# Patient Record
Sex: FEMALE | Race: WHITE | NOT HISPANIC OR LATINO | Employment: FULL TIME | URBAN - METROPOLITAN AREA
[De-identification: names, ages, dates, MRNs, and addresses within clinical notes are randomized per-mention and may not be internally consistent; named-entity substitution may affect disease eponyms.]

---

## 2017-05-07 ENCOUNTER — TELEPHONE (OUTPATIENT)
Dept: NURSING | Facility: CLINIC | Age: 37
End: 2017-05-07

## 2017-05-07 NOTE — TELEPHONE ENCOUNTER
Call Type: Triage Call    Presenting Problem: patient states she was walking to her car and  twisted her Left foot in a dip in the grass and fell, she has noted  brusing, uanble to weight bear without 8/10 pain on the top left  side of her foot, ankle is fine. Pain is 5/10 at rest. 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  Triage Note:  Guideline Title: Foot Injury  Recommended Disposition: Provide Home/Self Care  Original Inclination: Wanted to speak with a nurse  Override Disposition:  Intended Action: Go to Urgent Care Center  Physician Contacted: No  Twisting injury with minimal symptoms now ?  YES  Foreign body that can't be removed ? NO  Burn injury ? NO  Orthopedic hardware (metal plate, lissy or screw) newly bulging under or through  skin ? NO  Unable to remove rings after using soap, lotion, oil, petroleum jelly or other  lubricants AND causing pain, discoloration or severe swelling ? NO  New severe pain ? NO  Wearing cast or splint AND new or worsening pain, swelling, numbness, tingling,  coolness or change in color that is NOT improved by elevation for 30 minutes OR  not resolved within 2 hours ? NO  New marked swelling (twice the normal size as compared to usual appearance) ? NO  Crushing or penetrating injury ? NO  Nail completely torn off or hanging OR hematoma involving more than half of nail  OR small hematoma with severe pain ? NO  New onset of inability to take unassisted weight-bearing steps ? NO  Obvious new deformity (bone is visibly out of place or misshapen) ? NO  Unable to remove rings after using soap, lotion, oil, petroleum jelly or other  lubricants AND is NOT causing pain, discoloration or severe swelling ? NO  Unbearable pain since injury ? NO  Any full thickness puncture wound (passes through the skin layers of epidermis and  dermis and penetrates into the underlying subcutaneous tissue) OR a  puncture  wound where the bottom of wound is not visible (even when wound edges are  ) ? NO  Cut, abrasion, or laceration is the primary concern ? NO  Puncture wound is the primary concern ? NO  Swelling continues or has not improved after home care for 72 hours or more. ? NO  Pain continues or has not improved after home care for 7 days or more. ? NO  New onset mild to moderate pain that has not improved with 48 hours of medical  treatment or home care ? NO  Extremity swelling or limitation of range of motion AND known bleeding disorder OR  taking blood thinner, chemotherapy or transplant medications ? NO  Any new OR worsening signs and symptoms of soft tissue infection ? NO  Any signs and symptoms of soft tissue infection that have not improved with 48  hours of medical care ? NO  Pain persisting for more than 8 hours after injury AND history of prior surgery to  the injured area. ? NO  Severe traumatic injury to extremity(ies) including complete or partial amputation  (other than single finger/toe); large amount or uncontrolled bleeding; severe  crush injury; or open fracture (bone through skin) ? NO  Injury immediately followed new (within last 8 hours) unexplained  weakness/paralysis, change in sensation, or inability to purposely move,  especially when only one side of body is involved. ? NO  New, persistent  change in sensation (numb, tingling, or no feeling), change in  skin color (pale or blue), feels cool to the touch, severe pain or no pulse  below level of injury. ? NO  Physician Instructions:  Care Advice: See provider if pain continues for 7 days with home care.  CAUTIONS  Avoid activity that causes or worsens symptoms.  Limit Swelling and Reduce Pain: - Rest the painful area and limit weight  bearing or gripping, and any strenuous exercise, especially repetitive  motion, playing tennis, or any activity that makes the pain worse. - Apply  a cloth-covered cold pack to the extremity for 15 - 20  minutes, 4 to 8  times a day. Cold helps relieve pain and swelling. Do not apply cold  therapy to a site for more than 30 minutes at a time. - Apply an elastic  bandage to the extremity to limit the swelling. Do not wrap extremity too  tightly. - Elevate the extremity above the level of the heart.  Analgesic/Antipyretic Advice - NSAIDs: Consider aspirin, ibuprofen,  naproxen or ketoprofen for pain or fever as directed on label or by  pharmacist/provider. PRECAUTIONS: - You should not take this medicine for  more than 10 days unless recommended by your provider. EXCEPTIONS: - Should  not be used if taking blood thinners or have bleeding problems. - Do not  use if have history of sensitivity/allergy to any of these medications  or history of cardiovascular, ulcer, kidney, liver disease or diabetes  unless approved by provider. - Do not exceed recommended dose or frequency.  Analgesic/Antipyretic Advice - Acetaminophen: Consider acetaminophen as  directed on label or by pharmacist/provider for pain or fever. PRECAUTIONS:  - Use if there is no history of liver disease, alcoholism, or intake of  three or more alcohol drinks per day - Only if approved by provider during  pregnancy or when breastfeeding - Do not exceed recommended dose or  frequency. Do not take more than 3000 milligrams (mg) in 24 hours. Do not  take this medicine for more than 10 days unless recommended by your  provider. - During pregnancy, acetaminophen should not be taken more than 3  consecutive days without telling provider - To make sure you don't take too  much, check other medicines you take to see if they also contain  acetaminophen.

## 2017-05-10 ENCOUNTER — TELEPHONE (OUTPATIENT)
Dept: FAMILY MEDICINE | Facility: OTHER | Age: 37
End: 2017-05-10

## 2017-05-10 ENCOUNTER — OFFICE VISIT (OUTPATIENT)
Dept: FAMILY MEDICINE | Facility: OTHER | Age: 37
End: 2017-05-10
Payer: COMMERCIAL

## 2017-05-10 VITALS
TEMPERATURE: 98.4 F | SYSTOLIC BLOOD PRESSURE: 120 MMHG | BODY MASS INDEX: 27.78 KG/M2 | DIASTOLIC BLOOD PRESSURE: 68 MMHG | OXYGEN SATURATION: 98 % | HEIGHT: 67 IN | WEIGHT: 177 LBS | HEART RATE: 114 BPM

## 2017-05-10 DIAGNOSIS — R82.90 NONSPECIFIC FINDING ON EXAMINATION OF URINE: ICD-10-CM

## 2017-05-10 DIAGNOSIS — N30.01 ACUTE CYSTITIS WITH HEMATURIA: Primary | ICD-10-CM

## 2017-05-10 DIAGNOSIS — S92.355D CLOSED NONDISPLACED FRACTURE OF FIFTH METATARSAL BONE OF LEFT FOOT WITH ROUTINE HEALING, SUBSEQUENT ENCOUNTER: ICD-10-CM

## 2017-05-10 LAB
ALBUMIN UR-MCNC: ABNORMAL MG/DL
APPEARANCE UR: CLEAR
BACTERIA #/AREA URNS HPF: ABNORMAL /HPF
BILIRUB UR QL STRIP: NEGATIVE
COLOR UR AUTO: YELLOW
GLUCOSE UR STRIP-MCNC: NEGATIVE MG/DL
HGB UR QL STRIP: ABNORMAL
KETONES UR STRIP-MCNC: NEGATIVE MG/DL
LEUKOCYTE ESTERASE UR QL STRIP: ABNORMAL
NITRATE UR QL: NEGATIVE
PH UR STRIP: 6 PH (ref 5–7)
RBC #/AREA URNS AUTO: ABNORMAL /HPF (ref 0–2)
SP GR UR STRIP: 1.02 (ref 1–1.03)
URN SPEC COLLECT METH UR: ABNORMAL
UROBILINOGEN UR STRIP-ACNC: 0.2 EU/DL (ref 0.2–1)
WBC #/AREA URNS AUTO: ABNORMAL /HPF (ref 0–2)

## 2017-05-10 PROCEDURE — 87186 SC STD MICRODIL/AGAR DIL: CPT | Performed by: PHYSICIAN ASSISTANT

## 2017-05-10 PROCEDURE — 99214 OFFICE O/P EST MOD 30 MIN: CPT | Performed by: PHYSICIAN ASSISTANT

## 2017-05-10 PROCEDURE — 87088 URINE BACTERIA CULTURE: CPT | Performed by: PHYSICIAN ASSISTANT

## 2017-05-10 PROCEDURE — 81001 URINALYSIS AUTO W/SCOPE: CPT | Performed by: PHYSICIAN ASSISTANT

## 2017-05-10 PROCEDURE — 87086 URINE CULTURE/COLONY COUNT: CPT | Performed by: PHYSICIAN ASSISTANT

## 2017-05-10 RX ORDER — TRAMADOL HYDROCHLORIDE 50 MG/1
50 TABLET ORAL EVERY 6 HOURS PRN
Qty: 40 TABLET | Refills: 0 | Status: SHIPPED | OUTPATIENT
Start: 2017-05-10 | End: 2017-05-10

## 2017-05-10 RX ORDER — TRAMADOL HYDROCHLORIDE 50 MG/1
50 TABLET ORAL EVERY 6 HOURS PRN
Qty: 40 TABLET | Refills: 0 | Status: SHIPPED | OUTPATIENT
Start: 2017-05-10 | End: 2018-06-13

## 2017-05-10 RX ORDER — NITROFURANTOIN 25; 75 MG/1; MG/1
100 CAPSULE ORAL 2 TIMES DAILY
Qty: 14 CAPSULE | Refills: 0 | Status: SHIPPED | OUTPATIENT
Start: 2017-05-10 | End: 2018-06-13

## 2017-05-10 RX ORDER — ESCITALOPRAM OXALATE 10 MG/1
10 TABLET ORAL DAILY
COMMUNITY
Start: 2017-04-25 | End: 2021-03-26

## 2017-05-10 ASSESSMENT — PAIN SCALES - GENERAL: PAINLEVEL: SEVERE PAIN (7)

## 2017-05-10 NOTE — TELEPHONE ENCOUNTER
Electronic communication with SANDRA. Informed he is able to see the patient today. Imelda Hoyos, RN, BSN   Scheduled appt today.  Next 5 appointments (look out 90 days)     May 10, 2017  3:30 PM CDT   SHORT with Don Prieto PA-C   [facility]    [address] [place] [id number]   [phone]                Imelda Hoyos, RN, BSN

## 2017-05-10 NOTE — TELEPHONE ENCOUNTER
Requested Provider:  anyone    PCP: Unknown, Provider    Reason for visit: UTI    Duration of symptoms: today    Have you been treated for this in the past? No    Additional comments: Patient prefers [facility] today, she declined future appt.

## 2017-05-10 NOTE — TELEPHONE ENCOUNTER
Routing to DP for today JM: Work in Request for possible UTI. Patient is leaving the state tomorrow and would like to see a provider verse being seen in UC,or [facility]. Please review and advise.     Jessica Tovar is a 36 year old female who calls with possible UTI.    NURSING ASSESSMENT:  Description:  Frequency, with pain while urinating, scant amount. Broke her foot on Saturday and feed that an Urgent Care doesn't do a thorough job. Denies fever, rashes, runny nose, pelvic pain, blood in urine, back/side pain. Has tried pushing fluids. Is leaving the state tomorrow and would like to be seen prior to this progressing.   Onset/duration:  today  Pain scale (0-10)   7/10 burning   Last exam/Treatment:  07/11/2007  Allergies:   Allergies   Allergen Reactions     Ofloxacin      floxin     NURSING PLAN: Nursing advice to patient to be seen by a provider within 2-4 hours    RECOMMENDED DISPOSITION:  See in 4 hours, another person to drive - for severe discomfort with urination  Will comply with recommendation: Yes  If further questions/concerns or if symptoms do not improve, worsen or new symptoms develop, call your PCP or [facility] as soon as possible.    NOTES:  Disposition was determined by the first positive assessment question, therefore all previous assessment questions were negative    Guideline used:  Telephone Triage Protocols for Nurses, Fourth Edition, Bria Chavis  Urination, painful  Nursing judgment  Routing to SANDRA Hoyos RN

## 2017-05-10 NOTE — MR AVS SNAPSHOT
After Visit Summary   5/10/2017    Jessica Tovar    MRN: [id number]           Patient Information     Date Of Birth          1980        Visit Information        Provider Department      5/10/2017 3:30 PM Don Prieto PA-C [facility]        Today's Diagnoses     Acute cystitis with hematuria    -  1    Nonspecific finding on examination of urine        Closed nondisplaced fracture of fifth metatarsal bone of left foot with routine healing, subsequent encounter          Care Instructions    You have a urinary tract infection so will treat with a 7 day course of Macrobid.  Drink plenty of fluids.  If symptoms do not improve, let me know.    Will prescribe tramadol to take during the day as needed to help with pain.  Start to wean off the boot in 3 weeks as directed.  If pain is not improving over the next month, you need to be seen again.  Ice the area to help with swelling.             Follow-ups after your visit        Who to contact     If you have questions or need follow up information about today's clinic visit or your schedule please contact [facility] directly at [phone].  Normal or non-critical lab and imaging results will be communicated to you by Content360hart, letter or phone within 4 business days after the clinic has received the results. If you do not hear from us within 7 days, please contact the clinic through Shout TVt or phone. If you have a critical or abnormal lab result, we will notify you by phone as soon as possible.  Submit refill requests through Blacklane or call your pharmacy and they will forward the refill request to us. Please allow 3 business days for your refill to be completed.          Additional Information About Your Visit        MyChart Information     Blacklane lets you send messages to your doctor, view your test results, renew your prescriptions, schedule appointments and more. To sign up, go to [email or web address] .  "Click on \"Log in\" on the left side of the screen, which will take you to the Welcome page. Then click on \"Sign up Now\" on the right side of the page.     You will be asked to enter the access code listed below, as well as some personal information. Please follow the directions to create your username and password.     Your access code is: [email or web address]  Expires: 2017  4:03 PM     Your access code will  in 90 days. If you need help or a new code, please call your [facility] clinic or [phone].        Care EveryWhere ID     This is your Care EveryWhere ID. This could be used by other organizations to access your [facility] medical records  [id number]        Your Vitals Were     Pulse Temperature Height Pulse Oximetry BMI (Body Mass Index)       114 98.4  F (36.9  C) (Temporal) 5' 7\" (1.702 m) 98% 27.72 kg/m2        Blood Pressure from Last 3 Encounters:   05/10/17 120/68   10/20/16 143/76   03/11/15 126/84    Weight from Last 3 Encounters:   05/10/17 177 lb (80.3 kg)   10/20/16 154 lb (69.9 kg)   07 140 lb (63.5 kg)              We Performed the Following     UA with Microscopic reflex to Culture     Urine Culture Aerobic Bacterial          Today's Medication Changes          These changes are accurate as of: 5/10/17  4:03 PM.  If you have any questions, ask your nurse or doctor.               Start taking these medicines.        Dose/Directions    nitrofurantoin (macrocrystal-monohydrate) 100 MG capsule   Commonly known as:  MACROBID   Used for:  Acute cystitis with hematuria   Started by:  Don Prieto PA-C        Dose:  100 mg   Take 1 capsule (100 mg) by mouth 2 times daily   Quantity:  14 capsule   Refills:  0       traMADol 50 MG tablet   Commonly known as:  ULTRAM   Used for:  Closed nondisplaced fracture of fifth metatarsal bone of left foot with routine healing, subsequent encounter   Started by:  Don Prieto PA-C        Dose:  50 mg   Take 1 tablet (50 mg) by mouth every 6 " hours as needed for pain   Quantity:  40 tablet   Refills:  0         Stop taking these medicines if you haven't already. Please contact your care team if you have questions.     ATIVAN 1 MG tablet   Generic drug:  LORazepam   Stopped by:  Don Prieto PA-C           FLUoxetine 10 MG capsule   Commonly known as:  PROzac   Stopped by:  Don Prieto PA-C           PAXIL CR 25 MG 24 hr tablet   Generic drug:  PARoxetine   Stopped by:  Don Prieto PA-C                Where to get your medicines      These medications were sent to [id number] - [place], [place] - [address]  [address] [address]    Hours:  [facility] [place] - numbers unchanged   9/8/03 kr Phone:  [phone]     nitrofurantoin (macrocrystal-monohydrate) 100 MG capsule         Some of these will need a paper prescription and others can be bought over the counter.  Ask your nurse if you have questions.     Bring a paper prescription for each of these medications     traMADol 50 MG tablet                Primary Care Provider    Provider Unknown       No address on file        Thank you!     Thank you for choosing [facility]  for your care. Our goal is always to provide you with excellent care. Hearing back from our patients is one way we can continue to improve our services. Please take a few minutes to complete the written survey that you may receive in the mail after your visit with us. Thank you!             Your Updated Medication List - Protect others around you: Learn how to safely use, store and throw away your medicines at www.disposemymeds.org.          This list is accurate as of: 5/10/17  4:03 PM.  Always use your most recent med list.                   Brand Name Dispense Instructions for use    ALEVE 220 MG tablet   Generic drug:  naproxen sodium      Reported on 5/10/2017       escitalopram 10 MG tablet    LEXAPRO     Take 10 mg by mouth daily       HYDROcodone-acetaminophen 5-325 MG  per tablet    NORCO    8 tablet    Take 1 tablet by mouth every 6 hours as needed for moderate to severe pain       INDERAL LA 60 MG 24 hr capsule   Generic drug:  propranolol     34    1 CAPSULE DAILY       nitrofurantoin (macrocrystal-monohydrate) 100 MG capsule    MACROBID    14 capsule    Take 1 capsule (100 mg) by mouth 2 times daily       traMADol 50 MG tablet    ULTRAM    40 tablet    Take 1 tablet (50 mg) by mouth every 6 hours as needed for pain

## 2017-05-10 NOTE — PROGRESS NOTES
"  SUBJECTIVE:                                                    Jessica Tovar is a 36 year old female who presents to clinic today for the following health issues:    HPI    URINARY TRACT SYMPTOMS     Onset: today    Description:   Painful urination (Dysuria): YES  Blood in urine (Hematuria): no  Delay in urine (Hesitency): YES     Intensity: 7/10    Progression of Symptoms:  NA    Accompanying Signs & Symptoms:  Fever/chills: no   Flank pain YES  Nausea and vomiting: no   Any vaginal symptoms: none  Abdominal/Pelvic Pain: no    History:   History of frequent UTI's: YES  History of kidney stones: no   Sexually Active: YES  Possibility of pregnancy: No    Precipitating factors:   Increased fluids         Therapies Tried and outcome: Increase fluid intake    Symptoms started today. She has a history of UTIs and pyelonephritis in the past.    She sustained a left foot fracture this past weekend and was placed in a CAM boot. She was given Percocet do take nightly as needed for pain but states she needs something during the day and ibuprofen causes an upset stomach and Tylenol is not beneficial. She works at a school.     Triage note from 05/10: \"Frequency, with pain while urinating, scant amount. Broke her foot on Saturday and feed that an Urgent Care doesn't do a thorough job. Denies fever, rashes, runny nose, pelvic pain, blood in urine, back/side pain. Has tried pushing fluids. Is leaving the state tomorrow and would like to be seen prior to this progressing.\"    Problem list and histories reviewed & adjusted, as indicated.  Additional history: none    ROS:  GENERAL: Denies fever, fatigue, weakness, weight gain, or weight loss.  CARDIOVASCULAR: Denies chest pain, shortness of breath, irregular heartbeats,  palpitations, or edema.  RESPIRATORY: Denies cough, hemoptysis, and shortness of breath.  GENITOURINARY: +Increased frequency, urgency, dysuria. Denies hematuria or incontinence.   MUSCULOSKELETAL: +Left foot " "pain and bruising.   NEUROLOGIC: Denies headache, fainting, dizziness, memory loss, numbness, tingling, or seizures.    OBJECTIVE:                                                    /68 (BP Location: Right arm, Patient Position: Chair, Cuff Size: Adult Regular)  Pulse 114  Temp 98.4  F (36.9  C) (Temporal)  Ht 5' 7\" (1.702 m)  Wt 177 lb (80.3 kg)  SpO2 98%  BMI 27.72 kg/m2  Body mass index is 27.72 kg/(m^2).  GENERAL: healthy, alert and no distress  RESP: lungs clear to auscultation - no rales, rhonchi or wheezes  CV: regular rate and rhythm, normal S1 S2, no S3 or S4, no murmur, click or rub  MS: There is ecchymosis, swelling and tenderness over the left lateral foot.   BACK: no CVA tenderness, no paralumbar tenderness    Diagnostic Test Results:    XR Left Foot  5/8/17 at [facility]  FINDINGS:     Transverse nondisplaced fractures of the base of the left fifth metatarsal. No other fracture. No dislocation.    Results for orders placed or performed in visit on 05/10/17   UA with Microscopic reflex to Culture   Result Value Ref Range    Color Urine Yellow     Appearance Urine Clear     Glucose Urine Negative NEG mg/dL    Bilirubin Urine Negative NEG    Ketones Urine Negative NEG mg/dL    Specific Gravity Urine 1.025 1.003 - 1.035    pH Urine 6.0 5.0 - 7.0 pH    Protein Albumin Urine Trace (A) NEG mg/dL    Urobilinogen Urine 0.2 0.2 - 1.0 EU/dL    Nitrite Urine Negative NEG    Blood Urine Moderate (A) NEG    Leukocyte Esterase Urine Moderate (A) NEG    Source Unspecified Urine     WBC Urine 25-50 (A) 0 - 2 /HPF    RBC Urine 10-25 (A) 0 - 2 /HPF    Bacteria Urine Few (A) NEG /HPF          ASSESSMENT/PLAN:                                                        ICD-10-CM    1. Acute cystitis with hematuria N30.01 UA with Microscopic reflex to Culture     nitrofurantoin, macrocrystal-monohydrate, (MACROBID) 100 MG capsule   2. Nonspecific finding on examination of urine R82.90 Urine Culture Aerobic " Bacterial   3. Closed nondisplaced fracture of fifth metatarsal bone of left foot with routine healing, subsequent encounter S92.355D traMADol (ULTRAM) 50 MG tablet     DISCONTINUED: traMADol (ULTRAM) 50 MG tablet         1-2. Symptoms and UA consistent with acute cystitis so will treat with a 10 day course of Macrobid. I encouraged her to drink plenty of water and if symptoms are not improving, she will contact the clinic.    3. I reviewed the x-ray results form [facility] and she has a non-displaced [place] fracture. She will continue with her CAM boot for 3 weeks and then start to wean off of it. She is unable to take the Percocet during the day during her job so will prescribe tramadol instead to take every 6 hours as needed. This will be a one time prescription. She will continue with ice and elevation and follow up if the pain is not improving over the next month.    Don Prieto PA-C  [facility]

## 2017-05-10 NOTE — PATIENT INSTRUCTIONS
You have a urinary tract infection so will treat with a 7 day course of Macrobid.  Drink plenty of fluids.  If symptoms do not improve, let me know.    Will prescribe tramadol to take during the day as needed to help with pain.  Start to wean off the boot in 3 weeks as directed.  If pain is not improving over the next month, you need to be seen again.  Ice the area to help with swelling.

## 2017-05-10 NOTE — NURSING NOTE
"Chief Complaint   Patient presents with     UTI       Initial /68 (BP Location: Right arm, Patient Position: Chair, Cuff Size: Adult Regular)  Pulse 114  Temp 98.4  F (36.9  C) (Temporal)  Ht 5' 7\" (1.702 m)  Wt 177 lb (80.3 kg)  SpO2 98%  BMI 27.72 kg/m2 Estimated body mass index is 27.72 kg/(m^2) as calculated from the following:    Height as of this encounter: 5' 7\" (1.702 m).    Weight as of this encounter: 177 lb (80.3 kg).  Medication Reconciliation: complete     Traci Silvestre, ROBERT      "

## 2017-05-13 LAB
BACTERIA SPEC CULT: ABNORMAL
MICRO REPORT STATUS: ABNORMAL
MICROORGANISM SPEC CULT: ABNORMAL
SPECIMEN SOURCE: ABNORMAL

## 2017-06-16 ENCOUNTER — TELEPHONE (OUTPATIENT)
Dept: FAMILY MEDICINE | Facility: OTHER | Age: 37
End: 2017-06-16

## 2017-06-16 NOTE — TELEPHONE ENCOUNTER
Summary:    Patient is due/failing the following:   PAP    Action needed:   Patient needs office visit for PAP.    Type of outreach:    none per care everywhere UTD    Questions for provider review:    None                                                                                                                                    Holley Bo  Please abstract the following data from this visit with this patient into the appropriate field in Epic:    Pap smear done on this date: 05/26/2016 (approximately), by this group: [facility], results in care everywhere.        Chart routed to abstraction .      Panel Management Review      Patient has the following on her problem list: None      Composite cancer screening  Chart review shows that this patient is due/due soon for the following Pap Smear

## 2018-06-13 ENCOUNTER — OFFICE VISIT (OUTPATIENT)
Dept: FAMILY MEDICINE | Facility: CLINIC | Age: 38
End: 2018-06-13
Payer: COMMERCIAL

## 2018-06-13 VITALS
WEIGHT: 191 LBS | TEMPERATURE: 97.6 F | OXYGEN SATURATION: 100 % | HEIGHT: 67 IN | SYSTOLIC BLOOD PRESSURE: 142 MMHG | RESPIRATION RATE: 20 BRPM | HEART RATE: 90 BPM | BODY MASS INDEX: 29.98 KG/M2 | DIASTOLIC BLOOD PRESSURE: 88 MMHG

## 2018-06-13 DIAGNOSIS — R03.0 ELEVATED BLOOD PRESSURE READING WITHOUT DIAGNOSIS OF HYPERTENSION: ICD-10-CM

## 2018-06-13 DIAGNOSIS — N92.0 EXCESSIVE OR FREQUENT MENSTRUATION: ICD-10-CM

## 2018-06-13 DIAGNOSIS — N94.6 DYSMENORRHEA: Primary | ICD-10-CM

## 2018-06-13 PROCEDURE — 99214 OFFICE O/P EST MOD 30 MIN: CPT | Performed by: FAMILY MEDICINE

## 2018-06-13 ASSESSMENT — PAIN SCALES - GENERAL: PAINLEVEL: NO PAIN (0)

## 2018-06-13 NOTE — MR AVS SNAPSHOT
After Visit Summary   6/13/2018    Jessica Tovar    MRN: [id number]           Patient Information     Date Of Birth          1980        Visit Information        Provider Department      6/13/2018 2:00 PM Damaso Eddy MD [facility]        Today's Diagnoses     Menorrhagia    -  1    Dysmenorrhea           Follow-ups after your visit        Additional Services     OB/GYN REFERRAL       Your provider has referred you to:  [facility]: [facility] [phone]   [email or web address]    Please be aware that coverage of these services is subject to the terms and limitations of your health insurance plan.  Call member services at your health plan with any benefit or coverage questions.      Please bring the following with you to your appointment:    (1) Any X-Rays, CTs or MRIs which have been performed.  Contact the facility where they were done to arrange for  prior to your scheduled appointment.   (2) List of current medications   (3) This referral request   (4) Any documents/labs given to you for this referral                  Your next 10 appointments already scheduled     Jun 19, 2018  1:40 PM CDT   Office Visit with Lazaro [facility]uitron [facility]   [facility]    [address] [place] [id number] [phone]           Bring a current list of meds and any records pertaining to this visit. For Physicals, please bring immunization records and any forms needing to be filled out. Please arrive 10 minutes early to complete paperwork.              Who to contact     If you have questions or need follow up information about today's clinic visit or your schedule please contact [facility] directly at [phone].  Normal or non-critical lab and imaging results will be communicated to you by MyChart, letter or phone within 4 business days after the clinic  "has received the results. If you do not hear from us within 7 days, please contact the clinic through [employer] or phone. If you have a critical or abnormal lab result, we will notify you by phone as soon as possible.  Submit refill requests through [employer] or call your pharmacy and they will forward the refill request to us. Please allow 3 business days for your refill to be completed.          Additional Information About Your Visit        [employer] Information     [employer] lets you send messages to your doctor, view your test results, renew your prescriptions, schedule appointments and more. To sign up, go to [email or web address] . Click on \"Log in\" on the left side of the screen, which will take you to the Welcome page. Then click on \"Sign up Now\" on the right side of the page.     You will be asked to enter the access code listed below, as well as some personal information. Please follow the directions to create your username and password.     Your access code is: [email or web address]  Expires: 2018  1:45 PM     Your access code will  in 90 days. If you need help or a new code, please call your [place] clinic or [phone].        Care EveryWhere ID     This is your Care EveryWhere ID. This could be used by other organizations to access your [place] medical records  [id number]        Your Vitals Were     Pulse Temperature Respirations Height Last Period Pulse Oximetry    90 97.6  F (36.4  C) (Temporal) 20 5' 7.1\" (1.704 m) 2018 100%    BMI (Body Mass Index)                   29.83 kg/m2            Blood Pressure from Last 3 Encounters:   18 142/88   05/10/17 120/68   10/20/16 143/76    Weight from Last 3 Encounters:   18 191 lb (86.6 kg)   05/10/17 177 lb (80.3 kg)   10/20/16 154 lb (69.9 kg)              We Performed the Following     OB/GYN REFERRAL        Primary Care Provider Fax #    Physician No Ref-Primary [phone]       No address on file        Equal Access to Services     " TARAS SCHNEIDER : Hadii aad ku marley Das, waaxda luqadaha, qaybta kaalmada shaunzackary, waxtess billie hayita khanhongjavier velarde . So [facility] [phone].    ATENCIÓN: Si habla español, tiene a fisher disposición servicios gratuitos de asistencia lingüística. Llame al [phone].    We comply with applicable federal civil rights laws and [place] laws. We do not discriminate on the basis of race, color, national origin, age, disability, sex, sexual orientation, or gender identity.            Thank you!     Thank you for choosing [facility]  for your care. Our goal is always to provide you with excellent care. Hearing back from our patients is one way we can continue to improve our services. Please take a few minutes to complete the written survey that you may receive in the mail after your visit with us. Thank you!             Your Updated Medication List - Protect others around you: Learn how to safely use, store and throw away your medicines at www.disposemymeds.org.          This list is accurate as of 6/13/18  2:54 PM.  Always use your most recent med list.                   Brand Name Dispense Instructions for use Diagnosis    ALEVE 220 MG tablet   Generic drug:  naproxen sodium      Reported on 5/10/2017        escitalopram 10 MG tablet    LEXAPRO     Take 10 mg by mouth daily

## 2018-06-13 NOTE — PROGRESS NOTES
SUBJECTIVE:   Jessica Tovar is a 37 year old female who presents to clinic today for the following health issues:      Chief Complaint   Patient presents with     Abnormal Bleeding Problem     Heavy periods with painful cramps ongoing         PROBLEMS TO ADD ON...  The patient notes that she has been having worsening periods over the past number of years.  She had her tubes tied with her second  back in  and has not been on birth control since.  She now bleeds for at least 7 days and for 3-4 of those she can barely leave the house for fear of having accidents.  She does wear a super tampons and maxipads but is still leak through.  She has a lot of cramping and uses Aleve ibuprofen and Tylenol to alleviate her cramping.  She has not been on any form of hormonal birth control for a number of years since she has had her tubes tied.    She knows that she did gain 50 pounds over the last couple years due to 2 foot fractures one on either foot at different times.  She would like to lose weight but has not been able to get back into her exercise routine.  She does work 2 jobs so is busy with work.  She does not smoke.  She has noticed that her blood pressure has been creeping up over the last couple years.    I reviewed her family history with her, she has depression and diabetes in her mother with diabetes in both her father and younger brother also.  Neither of her parents that she is aware of has had any issues with heart attack or stroke.    Problem list and histories reviewed & adjusted, as indicated.  Additional history: as documented    Patient Active Problem List   Diagnosis     Intractable migraine with aura     Previous  delivery, antepartum condition or complication     Past Surgical History:   Procedure Laterality Date     C  DELIVERY ONLY  10/27/99     C  DELIVERY ONLY  02    BTL at the time of      HC DILATION/CURETTAGE DIAG/THER NON OB  2001      "HC REMOVE TONSILS/ADENOIDS,<11 Y/O         Social History   Substance Use Topics     Smoking status: Former Smoker     Packs/day: 0.25     Years: 6.00     Types: Cigarettes     Quit date: 8/15/2001     Smokeless tobacco: Never Used      Comment: Stopped smoking when found out pg again.     Alcohol use Yes      Comment: Would drink 3 to 4  per month.     Family History   Problem Relation Age of Onset     DIABETES Mother      Depression Mother      Alcohol/Drug Father      ETOH     DIABETES Father      Depression Sister      older     Arthritis Maternal Grandmother      HEART DISEASE Paternal Grandfather      DIABETES Brother      younger     Other - See Comments Son      10/27/1999     Other - See Comments Son      05/08/2002         Current Outpatient Prescriptions   Medication Sig Dispense Refill     ALEVE 220 MG OR TABS Reported on 5/10/2017       escitalopram (LEXAPRO) 10 MG tablet Take 10 mg by mouth daily       Allergies   Allergen Reactions     Ofloxacin      floxin     No lab results found.   BP Readings from Last 3 Encounters:   06/13/18 142/88   05/10/17 120/68   10/20/16 143/76    Wt Readings from Last 3 Encounters:   06/13/18 191 lb (86.6 kg)   05/10/17 177 lb (80.3 kg)   10/20/16 154 lb (69.9 kg)            Reviewed and updated as needed this visit by clinical staff  Tobacco  Allergies  Meds  Soc Hx      Reviewed and updated as needed this visit by Provider         ROS:  Constitutional, HEENT, cardiovascular, pulmonary, gi and gu systems are negative, except as otherwise noted.    OBJECTIVE:     /88  Pulse 90  Temp 97.6  F (36.4  C) (Temporal)  Resp 20  Ht 5' 7.1\" (1.704 m)  Wt 191 lb (86.6 kg)  LMP 06/01/2018  SpO2 100%  BMI 29.83 kg/m2  Body mass index is 29.83 kg/(m^2).  GENERAL: healthy, alert and no distress  I did not do an exam today since he did not think it was indicated.  She is not currently got her period and has no cramping now.  Diagnostic Test Results:  none "     ASSESSMENT/PLAN:   (N92.0) Menorrhagia  (primary encounter diagnosis)  (N94.6) Dysmenorrhea  Comment: Patient is having excessively long and heavy periods with increased cramping over the past few years.  She would really like to have a hysterectomy but we discussed the various options that might be available to help control her symptoms.  We discussed hormonal control with OCPs versus Mirena IUD or Nexplanon insert or even the Depo-Provera shot.  Typically with progesterone only medications they decrease the buildup of the lining of the uterus which would help decrease the amount of bleeding that she gets each month and even can get her to the point where she has no bleeding at all.  The main issue with this hormone type of control is that it can have irregular bleeding for 3-6 months prior to that amenorrheic phase.    Next option would be to use something like a D&C with ablation which would have to be done with her GYN surgeon Dr. Huitron.  This essentially goes into clear out the uterine lining and then injure the endometrium by using heat to cauterize the cells which then prevent any buildup of the lining.  Again the majority will not have this procedure done do not get a.  Oftentimes if there are issues going on that are causing the cramping and wants help significant with the cramping issue.    The last option would be to do a hysterectomy and again she would need to see the GYN surgeon to discuss this procedure.  Plan: OB/GYN REFERRAL        The patient is agreeable to see Dr. Huitron to discuss her current issue and the options available again and to see what he would recommend.     (R03.0) Elevated blood pressure reading without diagnosis of hypertension  Comment: Patient had elevated systolic and diastolic pressures today and on recheck it was slightly improved but still borderline.  Plan: She will have it rechecked again when she sees Dr. Huitron in consultation and if it continues to be  "elevated may need to go on blood pressure medication.  She is aware that with weight loss she can lower blood pressure so she lost 35-40 pounds that would make a difference in her blood pressure and she may be able to avoid a blood pressure medication.     BP Screening:   Last 3 BP Readings:    BP Readings from Last 3 Encounters:   06/13/18 142/88   05/10/17 120/68   10/20/16 143/76       The following was recommended to the patient:  Re-screen within 4 weeks and recommend lifestyle modifications  Tobacco Cessation:   reports that she quit smoking about 16 years ago. Her smoking use included Cigarettes. She has a 1.50 pack-year smoking history. She has never used smokeless tobacco.      BMI:   Estimated body mass index is 29.83 kg/(m^2) as calculated from the following:    Height as of this encounter: 5' 7.1\" (1.704 m).    Weight as of this encounter: 191 lb (86.6 kg).   Weight management plan: The patient would like to lose weight but has not gotten back in an exercise program at this time.    cc: Lazaro Huitron M.D.     Electronically signed by:  Damaso Eddy M.D.  6/13/2018    "

## 2018-06-13 NOTE — NURSING NOTE
Chief Complaint   Patient presents with     Abnormal Bleeding Problem     Heavy periods with painful cramps ongoing     MP/MA

## 2018-06-19 ENCOUNTER — OFFICE VISIT (OUTPATIENT)
Dept: FAMILY MEDICINE | Facility: CLINIC | Age: 38
End: 2018-06-19
Payer: COMMERCIAL

## 2018-06-19 VITALS
OXYGEN SATURATION: 100 % | TEMPERATURE: 97.6 F | HEART RATE: 118 BPM | WEIGHT: 191 LBS | RESPIRATION RATE: 16 BRPM | BODY MASS INDEX: 29.83 KG/M2 | DIASTOLIC BLOOD PRESSURE: 92 MMHG | SYSTOLIC BLOOD PRESSURE: 140 MMHG

## 2018-06-19 DIAGNOSIS — R10.2 PELVIC PAIN IN FEMALE: ICD-10-CM

## 2018-06-19 DIAGNOSIS — N92.1 MENORRHAGIA WITH IRREGULAR CYCLE: Primary | ICD-10-CM

## 2018-06-19 LAB
ERYTHROCYTE [DISTWIDTH] IN BLOOD BY AUTOMATED COUNT: 13.2 % (ref 10–15)
HCT VFR BLD AUTO: 44.8 % (ref 35–47)
HGB BLD-MCNC: 14.3 G/DL (ref 11.7–15.7)
MCH RBC QN AUTO: 29.8 PG (ref 26.5–33)
MCHC RBC AUTO-ENTMCNC: 31.9 G/DL (ref 31.5–36.5)
MCV RBC AUTO: 93 FL (ref 78–100)
PLATELET # BLD AUTO: 214 10E9/L (ref 150–450)
RBC # BLD AUTO: 4.8 10E12/L (ref 3.8–5.2)
TSH SERPL DL<=0.005 MIU/L-ACNC: 0.77 MU/L (ref 0.4–4)
WBC # BLD AUTO: 9.7 10E9/L (ref 4–11)

## 2018-06-19 PROCEDURE — 85027 COMPLETE CBC AUTOMATED: CPT | Performed by: OBSTETRICS & GYNECOLOGY

## 2018-06-19 PROCEDURE — 84443 ASSAY THYROID STIM HORMONE: CPT | Performed by: OBSTETRICS & GYNECOLOGY

## 2018-06-19 PROCEDURE — 36415 COLL VENOUS BLD VENIPUNCTURE: CPT | Performed by: OBSTETRICS & GYNECOLOGY

## 2018-06-19 PROCEDURE — 88305 TISSUE EXAM BY PATHOLOGIST: CPT | Performed by: OBSTETRICS & GYNECOLOGY

## 2018-06-19 PROCEDURE — 58100 BIOPSY OF UTERUS LINING: CPT | Performed by: OBSTETRICS & GYNECOLOGY

## 2018-06-19 PROCEDURE — 99213 OFFICE O/P EST LOW 20 MIN: CPT | Mod: 25 | Performed by: OBSTETRICS & GYNECOLOGY

## 2018-06-19 ASSESSMENT — PAIN SCALES - GENERAL: PAINLEVEL: NO PAIN (0)

## 2018-06-19 NOTE — PROGRESS NOTES
SUBJECTIVE:                                                      Referral from Damaso Eddy       Reason for consultation:  Menorrhagia and severe dysmenorrhea    History:  Jessica  Is a 37 year old female  3 para ( 2 CS and 1 SAB )   who presents today because of menorrhagia for the past 3 years.  She had a tubal ligation with her last  so has not used birth control pills.  The menorrhagia is quite severe.  She soaks through pads for a few days each month.    She had a Pap through [facility] last year but I cannot currently find the results.  He states that it was normal.         Patient Active Problem List   Diagnosis     Intractable migraine with aura     Previous  delivery, antepartum condition or complication     Menorrhagia     Elevated blood pressure reading without diagnosis of hypertension     Past Surgical History:   Procedure Laterality Date     C  DELIVERY ONLY  10/27/99     C  DELIVERY ONLY  02    BTL at the time of      HC DILATION/CURETTAGE DIAG/THER NON OB  2001     HC REMOVE TONSILS/ADENOIDS,<13 Y/O         Social History   Substance Use Topics     Smoking status: Former Smoker     Packs/day: 0.25     Years: 6.00     Types: Cigarettes     Quit date: 8/15/2001     Smokeless tobacco: Never Used      Comment: Stopped smoking when found out pg again.     Alcohol use Yes      Comment: Would drink 3 to 4  per month.     Family History   Problem Relation Age of Onset     Diabetes Mother      Depression Mother      Alcohol/Drug Father      ETOH     Diabetes Father      Depression Sister      older     Arthritis Maternal Grandmother      HEART DISEASE Paternal Grandfather      Diabetes Brother      younger     Other - See Comments Son      10/27/1999     Other - See Comments Son      2002         Current Outpatient Prescriptions   Medication Sig Dispense Refill     ALEVE 220 MG OR TABS Reported on 5/10/2017       escitalopram  (LEXAPRO) 10 MG tablet Take 10 mg by mouth daily         ROS:  A 12 point systems review is negative except for what is listed above in the Subjective history.            OBJECTIVE:                                                    Vital signs: Blood pressure (!) 140/92, pulse 118, temperature 97.6  F (36.4  C), temperature source Temporal, resp. rate 16, weight 191 lb (86.6 kg), last menstrual period 2018, SpO2 100 %, not currently breastfeeding.    Repeat BP is 135/80    HEENT is normal.      Neck is supple, mobile, no adenoapthy or masses palpable. Normal range of motion noted.     Chest is clear to auscultation. No wheezes, rales or rhonchi heard.  cardiac exam is normal with s1, s2, no murmurs or adventitious sounds.Normal rate and rhythm is heard.     Abdomen is soft,  nondistended, No masses felt.No HSM. No guarding or rigidity or rebound noted. Palpation reveals  no    tenderness   Normal bowel sounds heard.   She has a healed scar from  section and also laparoscopic scars from cholecystectomy.    Pelvic exam:My nurse Gwyn Lujan was present to chaperone the exam.    The external genitalia appeared normal.      The vaginal vault was without bleeding  or   discharge or odor.      The cervix was smooth and shiny and normal in appearance.      No vaginal support defects were noted,      Bimanual exam revealed a 7 week   sized uterus. It does not   descend  at all  well in the vaginal vault.      No adnexal masses were felt.      There was no  cervical motion tenderness.      Exam was moderately  limited by the patient's body habitus as well as her guarding.      With my nurse present, and after receiving informed consent from the patient, I performed the endometrial biopsy in the usual fashion using a standard pipelle. The pipelle sounded to 6 cm. I sent the biopsy for pathology. She tolerated the procedure well. She was instructed to call or present to clinic or ER if she has unusual amount of  cramping, bleeding, fever or vaginal discharge.                 ASSESSMENT/PLAN:                                                        1.37 year old female  with Menorrhagia. Differential diagnosis would include:  A. thyroid dysfunction(hypothroid or hyperthroid)- will check TSH      B. uterine leiomyomata(fibroids)- -will check pelvic US     C. endometrial hyperplasia -  await the endometrial biopsy results    D. anovulatory cycles, such as from PCO, stress, weight gain or loss, aging, etc  E.  With the menorrhagia, one must rule out anemia.- will check cbc        2. Severe dysmenorrhea-this eliminates Mirena IUD and ablation as reasonable options that would treat both the menorrhagia and the pain.  Hysterectomy is probably her best option but I would like to see her weight first as this would lower her morbidity risk from surgery.     She told me that she is not interested in any option except hysterectomy. I advised her to try to lose 20 pounds first. She doesn't think this is possible. She is homebound for 1 week a month with these menstrual issues. I told her that at her current weight and with 2 CS and no vaginal deliveries her odds of an open procedure are higher than average. Also the risk of surgical morbidity goes up. She understands this and is willing to take her chances of higher morbidity- she really is miserable- I gave her booklets to read and we will discuss all of this more next week.      There are no incontinence issues-                                                                      Thank you for this consultation.    Copy to  Damaso Eddy MD  [facility]

## 2018-06-19 NOTE — MR AVS SNAPSHOT
After Visit Summary   6/19/2018    Jessica Tovar    MRN: [id number]           Patient Information     Date Of Birth          1980        Visit Information        Provider Department      6/19/2018 1:40 PM Lazaro Huitron MD [facility]        Today's Diagnoses     Menorrhagia with irregular cycle    -  1    Pelvic pain in female           Follow-ups after your visit        Your next 10 appointments already scheduled     Jun 20, 2018 10:30 AM CDT   US PELVIC COMPLETE W TRANSVAGINAL with ERUS1   [facility]    [address] [address]   [phone]           Please bring a list of your medicines (including vitamins, minerals and over-the-counter drugs). Also, tell your doctor about any allergies you may have. Wear comfortable clothes and leave your valuables at home.  Adults: Drink six 8-ounce glasses of fluid one hour before your exam. Do NOT empty your bladder.  If you need to empty your bladder before your exam, try to release only a little bit of urine. Then, drink another 8oz glass of fluid.  Children: Children who are potty trained should drink at least 4 cups (32 oz) of liquid 45 minutes to one hour prior to the exam. The child s bladder must be full in order to achieve a diagnostic exam. If your child is very uncomfortable or has an urgent need to pee, please notify a technologist; they will try to find out how much longer the wait may be and provide instructions to help relieve the pressure. Occasionally it is medically necessary to insert a urinary catheter to fill the bladder.  Please call the Imaging Department at your exam site with any questions.            Jun 28, 2018 10:50 AM CDT   [facility]    [address] [id number] [phone]              Future tests that were ordered for you  "today     Open Future Orders        Priority Expected Expires Ordered    US Pelvic Complete with Transvaginal Routine  2019            Who to contact     If you have questions or need follow up information about today's clinic visit or your schedule please contact [facility] directly at [phone].  Normal or non-critical lab and imaging results will be communicated to you by MyChart, letter or phone within 4 business days after the clinic has received the results. If you do not hear from us within 7 days, please contact the clinic through [employer] or phone. If you have a critical or abnormal lab result, we will notify you by phone as soon as possible.  Submit refill requests through [employer] or call your pharmacy and they will forward the refill request to us. Please allow 3 business days for your refill to be completed.          Additional Information About Your Visit        MyChart Information     [employer] lets you send messages to your doctor, view your test results, renew your prescriptions, schedule appointments and more. To sign up, go to [email or web address] . Click on \"Log in\" on the left side of the screen, which will take you to the Welcome page. Then click on \"Sign up Now\" on the right side of the page.     You will be asked to enter the access code listed below, as well as some personal information. Please follow the directions to create your username and password.     Your access code is: [email or web address]  Expires: 2018  1:45 PM     Your access code will  in 90 days. If you need help or a new code, please call your [facility] or [phone].        Care EveryWhere ID     This is your Care EveryWhere ID. This could be used by other organizations to access your [place] medical records  [id number]        Your Vitals Were     Pulse Temperature Respirations Last Period Pulse Oximetry Breastfeeding?    118 97.6  F (36.4  C) (Temporal) 16 2018 100% No    " BMI (Body Mass Index)                   29.83 kg/m2            Blood Pressure from Last 3 Encounters:   06/19/18 (!) 140/92   06/13/18 142/88   05/10/17 120/68    Weight from Last 3 Encounters:   06/19/18 191 lb (86.6 kg)   06/13/18 191 lb (86.6 kg)   05/10/17 177 lb (80.3 kg)              We Performed the Following     CBC with platelets     ENDOMETRIAL BIOPSY W/O CERVICAL DILATION     Surgical pathology exam     TSH with free T4 reflex        Primary Care Provider Office Phone # Fax #    Damaso Eddy [phone]       [address] [place] [address]        Equal Access to Services     TARAS SCHNEIDER : Linda Das, gustavo ivory, qarenate kaalmada yuri, hardik sierra. So [facility] [phone].    ATENCIÓN: Si habla español, tiene a fisher disposición servicios gratuitos de asistencia lingüística. Llame al [phone].    We comply with applicable federal civil rights laws and [place] laws. We do not discriminate on the basis of race, color, national origin, age, disability, sex, sexual orientation, or gender identity.            Thank you!     Thank you for choosing [facility]  for your care. Our goal is always to provide you with excellent care. Hearing back from our patients is one way we can continue to improve our services. Please take a few minutes to complete the written survey that you may receive in the mail after your visit with us. Thank you!             Your Updated Medication List - Protect others around you: Learn how to safely use, store and throw away your medicines at www.disposemymeds.org.          This list is accurate as of 6/19/18  2:06 PM.  Always use your most recent med list.                   Brand Name Dispense Instructions for use Diagnosis    ALEVE 220 MG tablet   Generic drug:  naproxen sodium      Reported on 5/10/2017        escitalopram 10 MG tablet    LEXAPRO     Take 10 mg by mouth daily

## 2018-06-20 ENCOUNTER — RADIANT APPOINTMENT (OUTPATIENT)
Dept: ULTRASOUND IMAGING | Facility: OTHER | Age: 38
End: 2018-06-20
Attending: OBSTETRICS & GYNECOLOGY
Payer: COMMERCIAL

## 2018-06-20 DIAGNOSIS — R10.2 PELVIC PAIN IN FEMALE: ICD-10-CM

## 2018-06-20 DIAGNOSIS — N92.1 MENORRHAGIA WITH IRREGULAR CYCLE: ICD-10-CM

## 2018-06-20 PROCEDURE — 76830 TRANSVAGINAL US NON-OB: CPT

## 2018-06-20 PROCEDURE — 76856 US EXAM PELVIC COMPLETE: CPT

## 2018-06-24 ENCOUNTER — MYC MEDICAL ADVICE (OUTPATIENT)
Dept: FAMILY MEDICINE | Facility: CLINIC | Age: 38
End: 2018-06-24

## 2018-06-24 LAB — COPATH REPORT: NORMAL

## 2018-06-25 NOTE — TELEPHONE ENCOUNTER
Patient had:  ENDOMETRIAL BIOPSY W/O CERVICAL DILATION [62236 (CPT )] (Order [id number])   Is it normal for patient to be spotting and cramping 6 days post procedure?    Routing to PCP for further advice.    Odalis Casillas RN

## 2018-06-28 ENCOUNTER — OFFICE VISIT (OUTPATIENT)
Dept: FAMILY MEDICINE | Facility: CLINIC | Age: 38
End: 2018-06-28
Payer: COMMERCIAL

## 2018-06-28 ENCOUNTER — TELEPHONE (OUTPATIENT)
Dept: OBGYN | Facility: CLINIC | Age: 38
End: 2018-06-28

## 2018-06-28 VITALS
DIASTOLIC BLOOD PRESSURE: 100 MMHG | TEMPERATURE: 98.2 F | BODY MASS INDEX: 29.67 KG/M2 | RESPIRATION RATE: 16 BRPM | WEIGHT: 190 LBS | SYSTOLIC BLOOD PRESSURE: 142 MMHG | OXYGEN SATURATION: 98 % | HEART RATE: 80 BPM

## 2018-06-28 DIAGNOSIS — I10 BENIGN ESSENTIAL HYPERTENSION: Primary | ICD-10-CM

## 2018-06-28 DIAGNOSIS — N92.0 MENORRHAGIA WITH REGULAR CYCLE: Primary | ICD-10-CM

## 2018-06-28 DIAGNOSIS — N92.1 MENORRHAGIA WITH IRREGULAR CYCLE: ICD-10-CM

## 2018-06-28 DIAGNOSIS — R10.2 PELVIC PAIN IN FEMALE: ICD-10-CM

## 2018-06-28 DIAGNOSIS — N83.201 CYST OF RIGHT OVARY: ICD-10-CM

## 2018-06-28 LAB
ANION GAP SERPL CALCULATED.3IONS-SCNC: 8 MMOL/L (ref 3–14)
BUN SERPL-MCNC: 10 MG/DL (ref 7–30)
CALCIUM SERPL-MCNC: 8.8 MG/DL (ref 8.5–10.1)
CHLORIDE SERPL-SCNC: 108 MMOL/L (ref 94–109)
CO2 SERPL-SCNC: 26 MMOL/L (ref 20–32)
CREAT SERPL-MCNC: 0.83 MG/DL (ref 0.52–1.04)
GFR SERPL CREATININE-BSD FRML MDRD: 77 ML/MIN/1.7M2
GLUCOSE SERPL-MCNC: 80 MG/DL (ref 70–99)
POTASSIUM SERPL-SCNC: 4 MMOL/L (ref 3.4–5.3)
SODIUM SERPL-SCNC: 142 MMOL/L (ref 133–144)

## 2018-06-28 PROCEDURE — 36415 COLL VENOUS BLD VENIPUNCTURE: CPT | Performed by: OBSTETRICS & GYNECOLOGY

## 2018-06-28 PROCEDURE — 99213 OFFICE O/P EST LOW 20 MIN: CPT | Performed by: OBSTETRICS & GYNECOLOGY

## 2018-06-28 PROCEDURE — 80048 BASIC METABOLIC PNL TOTAL CA: CPT | Performed by: OBSTETRICS & GYNECOLOGY

## 2018-06-28 RX ORDER — CEFAZOLIN SODIUM 1 G/50ML
1 INJECTION, SOLUTION INTRAVENOUS SEE ADMIN INSTRUCTIONS
Status: CANCELLED | OUTPATIENT
Start: 2018-06-28

## 2018-06-28 RX ORDER — OXYCODONE HCL 10 MG/1
10 TABLET, FILM COATED, EXTENDED RELEASE ORAL ONCE
Status: CANCELLED | OUTPATIENT
Start: 2018-06-28 | End: 2018-06-28

## 2018-06-28 RX ORDER — CEFAZOLIN SODIUM 2 G/100ML
2 INJECTION, SOLUTION INTRAVENOUS
Status: CANCELLED | OUTPATIENT
Start: 2018-06-28

## 2018-06-28 RX ORDER — LISINOPRIL 2.5 MG/1
2.5 TABLET ORAL DAILY
Qty: 30 TABLET | Refills: 1 | Status: SHIPPED | OUTPATIENT
Start: 2018-06-28 | End: 2021-03-26

## 2018-06-28 RX ORDER — PHENAZOPYRIDINE HYDROCHLORIDE 200 MG/1
200 TABLET, FILM COATED ORAL ONCE
Status: CANCELLED | OUTPATIENT
Start: 2018-06-28 | End: 2018-06-28

## 2018-06-28 ASSESSMENT — PAIN SCALES - GENERAL: PAINLEVEL: NO PAIN (0)

## 2018-06-28 NOTE — MR AVS SNAPSHOT
After Visit Summary   6/28/2018    Jessica Tovar    MRN: [id number]           Patient Information     Date Of Birth          1980        Visit Information        Provider Department      6/28/2018 10:50 AM Lazaro Huitron MD [facility]        Today's Diagnoses     Benign essential hypertension    -  1       Follow-ups after your visit        Who to contact     If you have questions or need follow up information about today's clinic visit or your schedule please contact [facility] directly at [phone].  Normal or non-critical lab and imaging results will be communicated to you by Cadre Technologieshart, letter or phone within 4 business days after the clinic has received the results. If you do not hear from us within 7 days, please contact the clinic through RentH[employer] or phone. If you have a critical or abnormal lab result, we will notify you by phone as soon as possible.  Submit refill requests through [employer] or call your pharmacy and they will forward the refill request to us. Please allow 3 business days for your refill to be completed.          Additional Information About Your Visit        MyChart Information     [employer] gives you secure access to your electronic health record. If you see a primary care provider, you can also send messages to your care team and make appointments. If you have questions, please call your primary care clinic.  If you do not have a primary care provider, please call [phone] and they will assist you.        Care EveryWhere ID     This is your Care EveryWhere ID. This could be used by other organizations to access your [place] medical records  [id number]        Your Vitals Were     Pulse Temperature Respirations Last Period Pulse Oximetry Breastfeeding?    80 98.2  F (36.8  C) (Temporal) 16 06/01/2018 98% No    BMI (Body Mass Index)                   29.67 kg/m2            Blood Pressure from Last 3 Encounters:   06/28/18  (!) 142/100   06/19/18 (!) 140/92   06/13/18 142/88    Weight from Last 3 Encounters:   06/28/18 190 lb (86.2 kg)   06/19/18 191 lb (86.6 kg)   06/13/18 191 lb (86.6 kg)              Today, you had the following     No orders found for display         Today's Medication Changes          These changes are accurate as of 6/28/18 11:51 AM.  If you have any questions, ask your nurse or doctor.               Start taking these medicines.        Dose/Directions    lisinopril 2.5 MG tablet   Commonly known as:  PRINIVIL/Zestril   Used for:  Benign essential hypertension   Started by:  Lazaro Huitron MD        Dose:  2.5 mg   Take 1 tablet (2.5 mg) by mouth daily   Quantity:  30 tablet   Refills:  1            Where to get your medicines      These medications were sent to [id number] - [place], [place] - [address]  [address] [address]    Hours:  Formerly [place] - numbers unchanged   9/8/03  Phone:  [phone]     lisinopril 2.5 MG tablet                Primary Care Provider Office Phone # Fax #    Damaso ALISSON Eddy [phone]       [address] [place] [address]        Equal Access to Services     TARAS SCHNEIDER AH: Hadii dawood ku hadasho Soomaali, waaxda luqadaha, qaybta kaalmada adeegyada, waxay idiin hayaan nick velarde . So [facility] [phone].    ATENCIÓN: Si habla español, tiene a fisher disposición servicios gratuitos de asistencia lingüística. Llame al [phone].    We comply with applicable federal civil rights laws and [place] laws. We do not discriminate on the basis of race, color, national origin, age, disability, sex, sexual orientation, or gender identity.            Thank you!     Thank you for choosing [facility]  for your care. Our goal is always to provide you with excellent care. Hearing back from our patients is one way we can continue to improve our services. Please take a few minutes to complete the written survey that  you may receive in the mail after your visit with us. Thank you!             Your Updated Medication List - Protect others around you: Learn how to safely use, store and throw away your medicines at www.disposemymeds.org.          This list is accurate as of 6/28/18 11:51 AM.  Always use your most recent med list.                   Brand Name Dispense Instructions for use Diagnosis    ALEVE 220 MG tablet   Generic drug:  naproxen sodium      Reported on 5/10/2017        escitalopram 10 MG tablet    LEXAPRO     Take 10 mg by mouth daily        lisinopril 2.5 MG tablet    PRINIVIL/Zestril    30 tablet    Take 1 tablet (2.5 mg) by mouth daily    Benign essential hypertension

## 2018-06-28 NOTE — PROGRESS NOTES
Subjective: here to discuss results. The endometrial biopsy is benign. The TSH and cbc are normal.    The US showed:    FINDINGS:  Transvaginal images were performed to better evaluate the  patient's uterus, ovaries and endometrial stripe.     The uterus is normal in size measuring 8.7 x 4.5 x 4.2 cm. No fibroids  are evident. Endometrial stripe measures 12 mm and is normal for  patient's age. The right ovary measures 3.7 x 3.2 x 2.9 cm and  contains a complex-appearing cystic lesion measuring 2.6 cm in  diameter. This is most likely a hemorrhagic cyst. The left ovary is  normal measuring 2.3 x 1.3 x 1.9 cm. The ovaries demonstrate normal  color Doppler flow bilaterally. No adnexal masses are present. A trace  amount of free pelvic fluid is present.         IMPRESSION: Complex right ovarian cyst measures 2.6 cm, likely a  hemorrhagic cyst. Trace amount of free pelvic fluid is likely  physiologic. Uterus, endometrial stripe and left ovary are otherwise  unremarkable. Followup ultrasound in 2 or 3 months as needed to assess  for resolution of the suspected right ovarian hemorrhagic cyst.     VICTOR M QUIROGA MD          The past medical history, social history, past surgical history and family history as shown below have been reviewed by me today.  Past Medical History:   Diagnosis Date     Migraine without aura, with intractable migraine, so stated, without mention of status migrainosus     Hx of migraines. Takes Midrin prn migraine per Dr. Eddy.     Varicella without mention of complication     Had chicken pox as a child ages 3 and 8        Allergies   Allergen Reactions     Ofloxacin      floxin     Current Outpatient Prescriptions   Medication Sig Dispense Refill     ALEVE 220 MG OR TABS Reported on 5/10/2017       escitalopram (LEXAPRO) 10 MG tablet Take 10 mg by mouth daily       lisinopril (PRINIVIL/ZESTRIL) 2.5 MG tablet Take 1 tablet (2.5 mg) by mouth daily 30 tablet 1     Past Surgical History:   Procedure  Laterality Date     C  DELIVERY ONLY  10/27/99     C  DELIVERY ONLY  02    BTL at the time of      HC DILATION/CURETTAGE DIAG/THER NON OB  2001     HC REMOVE TONSILS/ADENOIDS,<13 Y/O       Social History     Social History     Marital status: Single     Spouse name: N/A     Number of children: 2     Years of education: N/A     Occupational History     Day care 0unemployed     Social History Main Topics     Smoking status: Former Smoker     Packs/day: 0.25     Years: 6.00     Types: Cigarettes     Quit date: 8/15/2001     Smokeless tobacco: Never Used      Comment: Stopped smoking when found out pg again.     Alcohol use Yes      Comment: Would drink 3 to 4  per month.     Drug use: No     Sexual activity: Yes     Partners: Male      Comment: Was not using any form of birth control.     Other Topics Concern     None     Social History Narrative     Family History   Problem Relation Age of Onset     Diabetes Mother      Depression Mother      Alcohol/Drug Father      ETOH     Diabetes Father      Depression Sister      older     Arthritis Maternal Grandmother      HEART DISEASE Paternal Grandfather      Diabetes Brother      younger     Other - See Comments Son      10/27/1999     Other - See Comments Son      2002       ROS: A 12 point review of systems was done. Except for what is listed above in the HPI, the systems review is negative .      Objective: Vital signs: Blood pressure (!) 142/100, pulse 80, temperature 98.2  F (36.8  C), temperature source Temporal, resp. rate 16, weight 190 lb (86.2 kg), last menstrual period 2018, SpO2 98 %, not currently breastfeeding.    Repeat BP is 142/95    Fundi are benign- disc margins are sharp. No papilledema noted.          Assessment/Plan:A total of 25 minutes were spent face-to-face with this patient during today's consultation, with more than 50% of that time devoted to conversation and counseling about the management  decisions.      1. Benign essential hypertension- see rx for lisinopril-The potential side effects and risks of the medication were thoroughly discussed with the patient.  Will check lytes today-  rechekc here in 2 weeks    2. Ovarian cyst- will rechekc US in 2 months- she knows to set this up if she doesn't have the hysterectomy-otherwise it can be checked at hysterectomy    3.  Menorrhagia and severe dysmenorrhea-she is strongly requesting hysterectomy today.  I offered her an operative time in September because I am booked up before then.  She would really like to have this done ASA because she is off during the summer. I will refer her to Dr Hickey who may be able to work her in sooner.  I will also check to see if I can offer her the surgery with another 1 of my partners at [facility] during their OR blocktime-1 way or another we will see if we can accommodate her-    She will recheck with me in 2 weeks to evaluate her blood pressure again and I will do a preop exam on that time in anticipation that she will get her hysterectomy somewhere soon.      ARIANNE Huitron MD

## 2018-06-28 NOTE — TELEPHONE ENCOUNTER
"You are now scheduled for surgery at [facility].  Below are the details for your surgery.  Please read the \"Preparing for Your Surgery\" instructions and let us know if you have any questions.    Type of surgery: laparoscopic assisted vaginal hysterectomy  Surgeon:  Molly Vo MD  Location of surgery: [facility] OR    Date of surgery: 7-24-18    Time: 7:30am   Arrival Time: 6:00am    Time can change, to be confirmed a couple of days prior by pre-op surgery nurse.    Pre-Op Appt Date: Patient to schedule with a PCP or Family Practice Provider within 30 days to the surgery.  Post-Op Appt Date: To be determined by provider     Packet sent out: Yes  Pre-cert/Authorization completed:  TBD by Financial Securing Office.   MA Sterilization/Hysterectomy Acknowledgment Consent signed: To be signed AM of surgery.    [facility]  [phone]    Fax: [phone]  Same Day Surgery [phone]  Fax: [phone]    "

## 2018-06-29 NOTE — PROGRESS NOTES
Dania Please inform Jessica/ or caretaker  that this result(s) is/are normal.  Thanks. ARIANNE Huitron MD

## 2018-07-18 NOTE — TELEPHONE ENCOUNTER
Pt has not returned calls.  I was planning to give pt instructions and details at her appt with Dr. Vo 7-19-18.  Looks like pt cancelled this appt now.    Will route to Dr. Vo to advise.  Still proceed with surgery?    Please advise.    Thanks-    -Nicolasa Ying  [facility] [place]

## 2018-07-18 NOTE — TELEPHONE ENCOUNTER
Gilda, Molly Gallardo MD  [facility]  [facility]        Caller: Unspecified (2 weeks ago)                     Call pt and find out if she wishes to proceed with procedure as scheduled; if she does, then go over info over the phone and I can speak with her/meet her, in AM of procedure as Dr. Huitron has done the workup on this already   Molly Vo MD   [facility]       Tried to call pt again - No answer, VM Full.  Will try again later.  Message left in appt note (pt scheduled for pre-op tomorrow with Dr. Huitron) to have pt contact us.  Will also forward this note to Dr. Huitron.    -Nicolasa Ying  [facility]

## 2018-07-19 ENCOUNTER — OFFICE VISIT (OUTPATIENT)
Dept: FAMILY MEDICINE | Facility: CLINIC | Age: 38
End: 2018-07-19
Payer: COMMERCIAL

## 2018-07-19 VITALS
TEMPERATURE: 97 F | OXYGEN SATURATION: 100 % | RESPIRATION RATE: 16 BRPM | HEART RATE: 86 BPM | SYSTOLIC BLOOD PRESSURE: 136 MMHG | WEIGHT: 190 LBS | DIASTOLIC BLOOD PRESSURE: 88 MMHG | BODY MASS INDEX: 29.67 KG/M2

## 2018-07-19 DIAGNOSIS — K21.9 GASTROESOPHAGEAL REFLUX DISEASE WITHOUT ESOPHAGITIS: ICD-10-CM

## 2018-07-19 DIAGNOSIS — N92.1 MENORRHAGIA WITH IRREGULAR CYCLE: ICD-10-CM

## 2018-07-19 DIAGNOSIS — Z01.818 PRE-OP EXAM: Primary | ICD-10-CM

## 2018-07-19 DIAGNOSIS — I10 BENIGN ESSENTIAL HYPERTENSION: ICD-10-CM

## 2018-07-19 PROCEDURE — 99215 OFFICE O/P EST HI 40 MIN: CPT | Performed by: OBSTETRICS & GYNECOLOGY

## 2018-07-19 ASSESSMENT — PAIN SCALES - GENERAL: PAINLEVEL: NO PAIN (0)

## 2018-07-19 NOTE — TELEPHONE ENCOUNTER
Pt called back - all instructions given over the phone and mailed.  Pt will sign MA consent AM of surgery.    -Nicolasa MACARIO [facility]

## 2018-07-19 NOTE — MR AVS SNAPSHOT
After Visit Summary   7/19/2018    Jessica Tovar    MRN: [id number]           Patient Information     Date Of Birth          1980        Visit Information        Provider Department      7/19/2018 10:10 AM Lazaro Huitron MD [facility]        Today's Diagnoses     Pre-op exam    -  1    Menorrhagia with irregular cycle        Benign essential hypertension        Gastroesophageal reflux disease without esophagitis           Follow-ups after your visit        Your next 10 appointments already scheduled     Jul 24, 2018   Procedure with Molly Vo MD   [facility] (--)    [address] [address]   [phone]           The medical center is located at [address] (between [address] in [place], [place] miles north of [place]).              Who to contact     If you have questions or need follow up information about today's clinic visit or your schedule please contact [facility] directly at [phone].  Normal or non-critical lab and imaging results will be communicated to you by Ploongehart, letter or phone within 4 business days after the clinic has received the results. If you do not hear from us within 7 days, please contact the clinic through [employer] or phone. If you have a critical or abnormal lab result, we will notify you by phone as soon as possible.  Submit refill requests through [employer] or call your pharmacy and they will forward the refill request to us. Please allow 3 business days for your refill to be completed.          Additional Information About Your Visit        Ploon[employer]hart Information     [employer] gives you secure access to your electronic health record. If you see a primary care provider, you can also send messages to your care team and make appointments. If you have questions, please call your primary care clinic.  If you do not have a primary care provider, please call  [phone] and they will assist you.        Care EveryWhere ID     This is your Care EveryWhere ID. This could be used by other organizations to access your [facility] medical records  [id number]        Your Vitals Were     Pulse Temperature Respirations Last Period Pulse Oximetry Breastfeeding?    86 97  F (36.1  C) (Tympanic) 16 06/28/2018 100% No    BMI (Body Mass Index)                   29.67 kg/m2            Blood Pressure from Last 3 Encounters:   07/19/18 136/88   06/28/18 (!) 142/100   06/19/18 (!) 140/92    Weight from Last 3 Encounters:   07/19/18 190 lb (86.2 kg)   06/28/18 190 lb (86.2 kg)   06/19/18 191 lb (86.6 kg)              Today, you had the following     No orders found for display       Primary Care Provider Office Phone # Fax #    Damaso Eddy [phone]       [address] [place] [address]        Equal Access to Services     MELBA SCHNEIDER : Hadii dawood ku hadasho Soomaali, waaxda luqadaha, qaybta kaalmada adeegyada, waxtess velarde . So [facility] [phone].    ATENCIÓN: Si habla español, tiene a fisher disposición servicios gratuitos de asistencia lingüística. Llame al [phone].    We comply with applicable federal civil rights laws and [place] laws. We do not discriminate on the basis of race, color, national origin, age, disability, sex, sexual orientation, or gender identity.            Thank you!     Thank you for choosing [facility]  for your care. Our goal is always to provide you with excellent care. Hearing back from our patients is one way we can continue to improve our services. Please take a few minutes to complete the written survey that you may receive in the mail after your visit with us. Thank you!             Your Updated Medication List - Protect others around you: Learn how to safely use, store and throw away your medicines at www.disposemymeds.org.          This list is accurate as of 7/19/18 11:30  AM.  Always use your most recent med list.                   Brand Name Dispense Instructions for use Diagnosis    ALEVE 220 MG tablet   Generic drug:  naproxen sodium      Reported on 5/10/2017        escitalopram 10 MG tablet    LEXAPRO     Take 10 mg by mouth daily        lisinopril 2.5 MG tablet    PRINIVIL/Zestril    30 tablet    Take 1 tablet (2.5 mg) by mouth daily    Benign essential hypertension

## 2018-07-19 NOTE — PROGRESS NOTES
[address] [address]  [phone]  Dept: [phone]    PRE-OP EVALUATION:  Today's date: 2018    Jessica Tovar (: 1980) presents for pre-operative evaluation assessment as requested by Dr. Jonas.  She requires evaluation and anesthesia risk assessment prior to undergoing surgery/procedure .    Fax number for surgical facility: [phone]  Primary Physician: Damaso Eddy  Type of Anesthesia Anticipated: General    Patient has a Health Care Directive or Living Will:  NO    Preop Questions 2018   Who is doing your surgery? lazaro jonas   What are you having done? hysterectomy   Date of Surgery/Procedure: 2018   Facility or Hospital where procedure/surgery will be performed: [facility]    1.  Do you have a history of Heart attack, stroke, stent, coronary bypass surgery, or other heart surgery? No   2.  Do you ever have any pain or discomfort in your chest? No   3.  Do you have a history of  Heart Failure? No   4.   Are you troubled by shortness of breath when:  walking on a level surface, or up a slight hill, or at night? UNKNOWN - Age related   5.  Do you currently have a cold, bronchitis or other respiratory infection? No   6.  Do you have a cough, shortness of breath, or wheezing? No   7.  Do you sometimes get pains in the calves of your legs when you walk? UNKNOWN - age related   8. Do you or anyone in your family have previous history of blood clots? No   9.  Do you or does anyone in your family have a serious bleeding problem such as prolonged bleeding following surgeries or cuts? No   10. Have you ever had problems with anemia or been told to take iron pills? YES - after    11. Have you had any abnormal blood loss such as black, tarry or bloody stools, or abnormal vaginal bleeding? YES - Periods   12. Have you ever had a blood transfusion? YES -  with  and  with D&C    13. Have you or any of your relatives ever had problems with anesthesia? No   14. Do you have sleep apnea, excessive snoring or daytime drowsiness? No   15. Do you have any prosthetic heart valves? No   16. Do you have prosthetic joints? No   17. Is there any chance that you may be pregnant? No       Preoperative History and Physical    Chief complaint/indicated for surgery/planned surgery:    Jessica is planning to undergo hysterectomy by Dr Vo.    Past Medical History:   Diagnosis Date     Migraine without aura, with intractable migraine, so stated, without mention of status migrainosus     Hx of migraines. Takes Midrin prn migraine per Dr. Eddy.     Varicella without mention of complication     Had chicken pox as a child ages 3 and 8     Current Outpatient Prescriptions   Medication Sig Dispense Refill     ALEVE 220 MG OR TABS Reported on 5/10/2017       escitalopram (LEXAPRO) 10 MG tablet Take 10 mg by mouth daily       lisinopril (PRINIVIL/ZESTRIL) 2.5 MG tablet Take 1 tablet (2.5 mg) by mouth daily 30 tablet 1       There has been no recent use of OTC or herbal medications. (except zantac-)  The patient denies any recent ASA or NSAID use.   The patient denies any recent steroid use within the last 6 months.   The patient denies any alcohol or drug use.     Allergies   Allergen Reactions     Ofloxacin      floxin     History   Smoking Status     Former Smoker     Packs/day: 0.25     Years: 6.00     Types: Cigarettes     Quit date: 8/15/2001   Smokeless Tobacco     Never Used     Comment: Stopped smoking when found out pg again.     Past Surgical History:   Procedure Laterality Date     C  DELIVERY ONLY  10/27/99     C  DELIVERY ONLY  02    BTL at the time of      HC DILATION/CURETTAGE DIAG/THER NON OB  2001     HC REMOVE TONSILS/ADENOIDS,<11 Y/O       Social History     Social History     Marital status: Single     Spouse name: N/A     Number of children: 2      Years of education: N/A     Occupational History     Day care 0unemployed     Social History Main Topics     Smoking status: Former Smoker     Packs/day: 0.25     Years: 6.00     Types: Cigarettes     Quit date: 8/15/2001     Smokeless tobacco: Never Used      Comment: Stopped smoking when found out pg again.     Alcohol use Yes      Comment: Would drink 3 to 4  per month.     Drug use: No     Sexual activity: Yes     Partners: Male      Comment: Was not using any form of birth control.     Other Topics Concern     Not on file     Social History Narrative     Family History   Problem Relation Age of Onset     Diabetes Mother      Depression Mother      Alcohol/Drug Father      ETOH     Diabetes Father      Depression Sister      older     Arthritis Maternal Grandmother      HEART DISEASE Paternal Grandfather      Diabetes Brother      younger     Other - See Comments Son      10/27/1999     Other - See Comments Son      05/08/2002       There is no family history of anesthesia reactions or malignant hyperthermia or psevdocholinestergse problem or porphyria.    Review Of Systems  Skin: negative  Eyes: negative  Ears/Nose/Throat: negative  Respiratory: No shortness of breath, dyspnea on exertion, cough, or hemoptysis  Cardiovascular: negative  Gastrointestinal: negative  Genitourinary: negative  Musculoskeletal: negative  Neurologic: negative  Psychiatric: negative  Hematologic/Lymphatic/Immunologic: negative  Endocrine: negative    Physical Exam:/88  Pulse 86  Temp 97  F (36.1  C) (Tympanic)  Resp 16  Wt 190 lb (86.2 kg)  LMP 06/28/2018  SpO2 100%  Breastfeeding? No  BMI 29.67 kg/m2    HEENT:    Sclerae and conjunctiva are normal.   Ear canals and TMs look normal.  Nasal mucosa is pink  - no polyps or masses seen.  sinuses are non tender to palpation.  Throat is unremarkable . Mucous membranes are moist.     Neck is supple, mobile, no adenopathy or masses palpable. The thyroid feels normal.   Normal  range of motion noted.    Chest is clear to auscultation.  No wheezes, rales or rhonchi heard.  Cardiac exam is normal with s1, s2, no murmurs or adventitious sounds.Normal rate and rhythm is heard.     Abdomen is soft,  nondistended, No masses felt.No HSM. No guarding or rigidity or rebound   noted. Palpation reveals  no    tenderness   Normal bowel sounds heard.     Extremities are pink and there is no cyanosis and there is no edema. Pulses are physiologic.    Motor and sensory exams are grossly normal. Cranial nerves 2-12 are intact. Cerebellar exam is normal.         Other:  Labs: cbc on 6/19/18 was normal  basic panel on  6/28/18 was normal    Assessment/Impression:  1.menorrhagia- planning to have laparoscopic assisted vaginal hysterectomy by Dr. Vo next week-    2.Preoperative  Examination: The patient is at LOW   risk for general anesthesia for the proposed surgery.      3. Benign essential hypertension- controlled with lisinopril- she was advised to hold that medication on the morning of surgery and if her BP is high it can be managed by anesthetist-    4. GERD- controlled with zantac-stable    Recommendations:  Approval given for general anesthesia.    Medications are to be stopped before surgery.   Discontinue ASA and NSAIDS 5 days prior to surgery to reduce bleeding risk.            ARIANNE Huitron MD

## 2018-07-23 ENCOUNTER — ANESTHESIA EVENT (OUTPATIENT)
Dept: SURGERY | Facility: CLINIC | Age: 38
End: 2018-07-23
Payer: COMMERCIAL

## 2018-07-24 ENCOUNTER — SURGERY (OUTPATIENT)
Age: 38
End: 2018-07-24

## 2018-07-24 ENCOUNTER — ANESTHESIA (OUTPATIENT)
Dept: SURGERY | Facility: CLINIC | Age: 38
End: 2018-07-24
Payer: COMMERCIAL

## 2018-07-24 ENCOUNTER — HOSPITAL ENCOUNTER (OUTPATIENT)
Facility: CLINIC | Age: 38
Discharge: HOME | End: 2018-07-24
Attending: OBSTETRICS & GYNECOLOGY | Admitting: OBSTETRICS & GYNECOLOGY
Payer: COMMERCIAL

## 2018-07-24 VITALS
RESPIRATION RATE: 16 BRPM | TEMPERATURE: 96.8 F | SYSTOLIC BLOOD PRESSURE: 96 MMHG | OXYGEN SATURATION: 97 % | DIASTOLIC BLOOD PRESSURE: 66 MMHG

## 2018-07-24 DIAGNOSIS — Z90.710 S/P LAPAROSCOPIC ASSISTED VAGINAL HYSTERECTOMY (LAVH): Primary | ICD-10-CM

## 2018-07-24 PROBLEM — N94.6 DYSMENORRHEA: Status: ACTIVE | Noted: 2018-07-24

## 2018-07-24 PROBLEM — N80.30 ENDOMETRIOSIS OF PELVIC PERITONEUM: Status: ACTIVE | Noted: 2018-07-24

## 2018-07-24 LAB
ABO + RH BLD: NORMAL
ABO + RH BLD: NORMAL
BLD GP AB SCN SERPL QL: NORMAL
BLOOD BANK CMNT PATIENT-IMP: NORMAL
HCG SERPL QL: NEGATIVE
HGB BLD-MCNC: 14.3 G/DL (ref 11.7–15.7)
SPECIMEN EXP DATE BLD: NORMAL

## 2018-07-24 PROCEDURE — 71000013 ZZH RECOVERY PHASE 1 LEVEL 1 EA ADDTL HR: Performed by: OBSTETRICS & GYNECOLOGY

## 2018-07-24 PROCEDURE — 27210794 ZZH OR GENERAL SUPPLY STERILE: Performed by: OBSTETRICS & GYNECOLOGY

## 2018-07-24 PROCEDURE — 71000012 ZZH RECOVERY PHASE 1 LEVEL 1 FIRST HR: Performed by: OBSTETRICS & GYNECOLOGY

## 2018-07-24 PROCEDURE — 88307 TISSUE EXAM BY PATHOLOGIST: CPT | Performed by: OBSTETRICS & GYNECOLOGY

## 2018-07-24 PROCEDURE — 86901 BLOOD TYPING SEROLOGIC RH(D): CPT | Performed by: OBSTETRICS & GYNECOLOGY

## 2018-07-24 PROCEDURE — 37000009 ZZH ANESTHESIA TECHNICAL FEE, EACH ADDTL 15 MIN: Performed by: OBSTETRICS & GYNECOLOGY

## 2018-07-24 PROCEDURE — 88307 TISSUE EXAM BY PATHOLOGIST: CPT | Mod: 26 | Performed by: OBSTETRICS & GYNECOLOGY

## 2018-07-24 PROCEDURE — 37000008 ZZH ANESTHESIA TECHNICAL FEE, 1ST 30 MIN: Performed by: OBSTETRICS & GYNECOLOGY

## 2018-07-24 PROCEDURE — 27110028 ZZH OR GENERAL SUPPLY NON-STERILE: Performed by: OBSTETRICS & GYNECOLOGY

## 2018-07-24 PROCEDURE — 36000063 ZZH SURGERY LEVEL 4 EA 15 ADDTL MIN: Performed by: OBSTETRICS & GYNECOLOGY

## 2018-07-24 PROCEDURE — 86900 BLOOD TYPING SEROLOGIC ABO: CPT | Performed by: OBSTETRICS & GYNECOLOGY

## 2018-07-24 PROCEDURE — 85018 HEMOGLOBIN: CPT | Performed by: OBSTETRICS & GYNECOLOGY

## 2018-07-24 PROCEDURE — 25000132 ZZH RX MED GY IP 250 OP 250 PS 637: Performed by: OBSTETRICS & GYNECOLOGY

## 2018-07-24 PROCEDURE — 36415 COLL VENOUS BLD VENIPUNCTURE: CPT | Performed by: OBSTETRICS & GYNECOLOGY

## 2018-07-24 PROCEDURE — 25000128 H RX IP 250 OP 636: Performed by: OBSTETRICS & GYNECOLOGY

## 2018-07-24 PROCEDURE — 25000125 ZZHC RX 250: Performed by: NURSE ANESTHETIST, CERTIFIED REGISTERED

## 2018-07-24 PROCEDURE — 25000128 H RX IP 250 OP 636: Performed by: NURSE ANESTHETIST, CERTIFIED REGISTERED

## 2018-07-24 PROCEDURE — 25000566 ZZH SEVOFLURANE, EA 15 MIN: Performed by: OBSTETRICS & GYNECOLOGY

## 2018-07-24 PROCEDURE — 86850 RBC ANTIBODY SCREEN: CPT | Performed by: OBSTETRICS & GYNECOLOGY

## 2018-07-24 PROCEDURE — 71000027 ZZH RECOVERY PHASE 2 EACH 15 MINS: Performed by: OBSTETRICS & GYNECOLOGY

## 2018-07-24 PROCEDURE — 27210995 ZZH RX 272: Performed by: OBSTETRICS & GYNECOLOGY

## 2018-07-24 PROCEDURE — 36000093 ZZH SURGERY LEVEL 4 1ST 30 MIN: Performed by: OBSTETRICS & GYNECOLOGY

## 2018-07-24 PROCEDURE — 84703 CHORIONIC GONADOTROPIN ASSAY: CPT | Performed by: OBSTETRICS & GYNECOLOGY

## 2018-07-24 PROCEDURE — 40000306 ZZH STATISTIC PRE PROC ASSESS II: Performed by: OBSTETRICS & GYNECOLOGY

## 2018-07-24 PROCEDURE — 25000125 ZZHC RX 250: Performed by: OBSTETRICS & GYNECOLOGY

## 2018-07-24 PROCEDURE — 58552 LAPARO-VAG HYST INCL T/O: CPT | Mod: 80 | Performed by: OBSTETRICS & GYNECOLOGY

## 2018-07-24 PROCEDURE — 58552 LAPARO-VAG HYST INCL T/O: CPT | Performed by: OBSTETRICS & GYNECOLOGY

## 2018-07-24 RX ORDER — OXYCODONE HYDROCHLORIDE 5 MG/1
5-10 TABLET ORAL
Status: COMPLETED | OUTPATIENT
Start: 2018-07-24 | End: 2018-07-24

## 2018-07-24 RX ORDER — PROPOFOL 10 MG/ML
INJECTION, EMULSION INTRAVENOUS PRN
Status: DISCONTINUED | OUTPATIENT
Start: 2018-07-24 | End: 2018-07-24

## 2018-07-24 RX ORDER — HYDROXYZINE HYDROCHLORIDE 25 MG/1
25 TABLET, FILM COATED ORAL
Status: COMPLETED | OUTPATIENT
Start: 2018-07-24 | End: 2018-07-24

## 2018-07-24 RX ORDER — SODIUM CHLORIDE, SODIUM LACTATE, POTASSIUM CHLORIDE, CALCIUM CHLORIDE 600; 310; 30; 20 MG/100ML; MG/100ML; MG/100ML; MG/100ML
INJECTION, SOLUTION INTRAVENOUS CONTINUOUS
Status: DISCONTINUED | OUTPATIENT
Start: 2018-07-24 | End: 2018-07-24 | Stop reason: HOSPADM

## 2018-07-24 RX ORDER — ONDANSETRON 2 MG/ML
INJECTION INTRAMUSCULAR; INTRAVENOUS PRN
Status: DISCONTINUED | OUTPATIENT
Start: 2018-07-24 | End: 2018-07-24

## 2018-07-24 RX ORDER — HYDRALAZINE HYDROCHLORIDE 20 MG/ML
2.5-5 INJECTION INTRAMUSCULAR; INTRAVENOUS EVERY 10 MIN PRN
Status: DISCONTINUED | OUTPATIENT
Start: 2018-07-24 | End: 2018-07-24 | Stop reason: HOSPADM

## 2018-07-24 RX ORDER — PHENAZOPYRIDINE HYDROCHLORIDE 200 MG/1
200 TABLET, FILM COATED ORAL ONCE
Status: COMPLETED | OUTPATIENT
Start: 2018-07-24 | End: 2018-07-24

## 2018-07-24 RX ORDER — ONDANSETRON 4 MG/1
4 TABLET, ORALLY DISINTEGRATING ORAL EVERY 30 MIN PRN
Status: DISCONTINUED | OUTPATIENT
Start: 2018-07-24 | End: 2018-07-24 | Stop reason: HOSPADM

## 2018-07-24 RX ORDER — FENTANYL CITRATE 50 UG/ML
25-50 INJECTION, SOLUTION INTRAMUSCULAR; INTRAVENOUS
Status: DISCONTINUED | OUTPATIENT
Start: 2018-07-24 | End: 2018-07-24 | Stop reason: HOSPADM

## 2018-07-24 RX ORDER — KETOROLAC TROMETHAMINE 30 MG/ML
INJECTION, SOLUTION INTRAMUSCULAR; INTRAVENOUS PRN
Status: DISCONTINUED | OUTPATIENT
Start: 2018-07-24 | End: 2018-07-24

## 2018-07-24 RX ORDER — NEOSTIGMINE METHYLSULFATE 1 MG/ML
VIAL (ML) INJECTION PRN
Status: DISCONTINUED | OUTPATIENT
Start: 2018-07-24 | End: 2018-07-24

## 2018-07-24 RX ORDER — DEXAMETHASONE SODIUM PHOSPHATE 4 MG/ML
INJECTION, SOLUTION INTRA-ARTICULAR; INTRALESIONAL; INTRAMUSCULAR; INTRAVENOUS; SOFT TISSUE PRN
Status: DISCONTINUED | OUTPATIENT
Start: 2018-07-24 | End: 2018-07-24

## 2018-07-24 RX ORDER — CEFAZOLIN SODIUM 2 G/100ML
2 INJECTION, SOLUTION INTRAVENOUS
Status: COMPLETED | OUTPATIENT
Start: 2018-07-24 | End: 2018-07-24

## 2018-07-24 RX ORDER — ESTRADIOL 2 MG/1
2 TABLET ORAL DAILY
Qty: 90 TABLET | Refills: 3 | Status: SHIPPED | OUTPATIENT
Start: 2018-07-24 | End: 2021-03-26

## 2018-07-24 RX ORDER — BUPIVACAINE HYDROCHLORIDE AND EPINEPHRINE 5; 5 MG/ML; UG/ML
INJECTION, SOLUTION PERINEURAL PRN
Status: DISCONTINUED | OUTPATIENT
Start: 2018-07-24 | End: 2018-07-24 | Stop reason: HOSPADM

## 2018-07-24 RX ORDER — NALOXONE HYDROCHLORIDE 0.4 MG/ML
.1-.4 INJECTION, SOLUTION INTRAMUSCULAR; INTRAVENOUS; SUBCUTANEOUS
Status: DISCONTINUED | OUTPATIENT
Start: 2018-07-24 | End: 2018-07-24 | Stop reason: HOSPADM

## 2018-07-24 RX ORDER — ONDANSETRON 4 MG/1
4 TABLET, ORALLY DISINTEGRATING ORAL
Status: DISCONTINUED | OUTPATIENT
Start: 2018-07-24 | End: 2018-07-24 | Stop reason: HOSPADM

## 2018-07-24 RX ORDER — METOCLOPRAMIDE 10 MG/1
10 TABLET ORAL EVERY 6 HOURS PRN
Status: DISCONTINUED | OUTPATIENT
Start: 2018-07-24 | End: 2018-07-24 | Stop reason: HOSPADM

## 2018-07-24 RX ORDER — ALBUTEROL SULFATE 0.83 MG/ML
2.5 SOLUTION RESPIRATORY (INHALATION) EVERY 4 HOURS PRN
Status: DISCONTINUED | OUTPATIENT
Start: 2018-07-24 | End: 2018-07-24 | Stop reason: HOSPADM

## 2018-07-24 RX ORDER — LIDOCAINE 40 MG/G
CREAM TOPICAL
Status: DISCONTINUED | OUTPATIENT
Start: 2018-07-24 | End: 2018-07-24 | Stop reason: HOSPADM

## 2018-07-24 RX ORDER — OXYCODONE HCL 10 MG/1
10 TABLET, FILM COATED, EXTENDED RELEASE ORAL ONCE
Status: COMPLETED | OUTPATIENT
Start: 2018-07-24 | End: 2018-07-24

## 2018-07-24 RX ORDER — SCOLOPAMINE TRANSDERMAL SYSTEM 1 MG/1
1 PATCH, EXTENDED RELEASE TRANSDERMAL ONCE
Status: DISCONTINUED | OUTPATIENT
Start: 2018-07-24 | End: 2018-07-24 | Stop reason: HOSPADM

## 2018-07-24 RX ORDER — OXYCODONE HYDROCHLORIDE 5 MG/1
5-10 TABLET ORAL
Qty: 30 TABLET | Refills: 0 | Status: SHIPPED | OUTPATIENT
Start: 2018-07-24 | End: 2018-08-07

## 2018-07-24 RX ORDER — METOCLOPRAMIDE HYDROCHLORIDE 5 MG/ML
10 INJECTION INTRAMUSCULAR; INTRAVENOUS EVERY 6 HOURS PRN
Status: DISCONTINUED | OUTPATIENT
Start: 2018-07-24 | End: 2018-07-24 | Stop reason: HOSPADM

## 2018-07-24 RX ORDER — FENTANYL CITRATE 50 UG/ML
INJECTION, SOLUTION INTRAMUSCULAR; INTRAVENOUS PRN
Status: DISCONTINUED | OUTPATIENT
Start: 2018-07-24 | End: 2018-07-24

## 2018-07-24 RX ORDER — CEFAZOLIN SODIUM 1 G/3ML
1 INJECTION, POWDER, FOR SOLUTION INTRAMUSCULAR; INTRAVENOUS SEE ADMIN INSTRUCTIONS
Status: DISCONTINUED | OUTPATIENT
Start: 2018-07-24 | End: 2018-07-24 | Stop reason: HOSPADM

## 2018-07-24 RX ORDER — MEPERIDINE HYDROCHLORIDE 25 MG/ML
12.5 INJECTION INTRAMUSCULAR; INTRAVENOUS; SUBCUTANEOUS
Status: DISCONTINUED | OUTPATIENT
Start: 2018-07-24 | End: 2018-07-24 | Stop reason: HOSPADM

## 2018-07-24 RX ORDER — IBUPROFEN 600 MG/1
600 TABLET, FILM COATED ORAL EVERY 6 HOURS PRN
Qty: 30 TABLET | Refills: 1 | Status: SHIPPED | OUTPATIENT
Start: 2018-07-24 | End: 2021-03-26

## 2018-07-24 RX ORDER — AMOXICILLIN 250 MG
1-2 CAPSULE ORAL 2 TIMES DAILY
Qty: 30 TABLET | Refills: 1 | Status: SHIPPED | OUTPATIENT
Start: 2018-07-24 | End: 2018-08-07

## 2018-07-24 RX ORDER — HYDROMORPHONE HYDROCHLORIDE 1 MG/ML
.3-.5 INJECTION, SOLUTION INTRAMUSCULAR; INTRAVENOUS; SUBCUTANEOUS EVERY 10 MIN PRN
Status: DISCONTINUED | OUTPATIENT
Start: 2018-07-24 | End: 2018-07-24 | Stop reason: HOSPADM

## 2018-07-24 RX ORDER — IBUPROFEN 600 MG/1
600 TABLET, FILM COATED ORAL
Status: DISCONTINUED | OUTPATIENT
Start: 2018-07-24 | End: 2018-07-24 | Stop reason: HOSPADM

## 2018-07-24 RX ORDER — ONDANSETRON 2 MG/ML
4 INJECTION INTRAMUSCULAR; INTRAVENOUS EVERY 30 MIN PRN
Status: DISCONTINUED | OUTPATIENT
Start: 2018-07-24 | End: 2018-07-24 | Stop reason: HOSPADM

## 2018-07-24 RX ORDER — ONDANSETRON 4 MG/1
4-8 TABLET, ORALLY DISINTEGRATING ORAL EVERY 8 HOURS PRN
Qty: 10 TABLET | Refills: 0 | Status: SHIPPED | OUTPATIENT
Start: 2018-07-24 | End: 2018-08-07

## 2018-07-24 RX ORDER — GLYCOPYRROLATE 0.2 MG/ML
INJECTION, SOLUTION INTRAMUSCULAR; INTRAVENOUS PRN
Status: DISCONTINUED | OUTPATIENT
Start: 2018-07-24 | End: 2018-07-24

## 2018-07-24 RX ADMIN — OXYCODONE HYDROCHLORIDE 10 MG: 10 TABLET, FILM COATED, EXTENDED RELEASE ORAL at 06:38

## 2018-07-24 RX ADMIN — VASOPRESSIN 8 ML: 20 INJECTION INTRAVENOUS at 08:50

## 2018-07-24 RX ADMIN — SODIUM CHLORIDE, POTASSIUM CHLORIDE, SODIUM LACTATE AND CALCIUM CHLORIDE: 600; 310; 30; 20 INJECTION, SOLUTION INTRAVENOUS at 06:38

## 2018-07-24 RX ADMIN — HYDROXYZINE HYDROCHLORIDE 25 MG: 25 TABLET ORAL at 10:52

## 2018-07-24 RX ADMIN — PROPOFOL 100 MG: 10 INJECTION, EMULSION INTRAVENOUS at 07:35

## 2018-07-24 RX ADMIN — PROPOFOL 100 MG: 10 INJECTION, EMULSION INTRAVENOUS at 07:51

## 2018-07-24 RX ADMIN — KETOROLAC TROMETHAMINE 30 MG: 30 INJECTION, SOLUTION INTRAMUSCULAR at 08:37

## 2018-07-24 RX ADMIN — Medication 0.5 MG: at 09:43

## 2018-07-24 RX ADMIN — Medication 3 MG: at 08:56

## 2018-07-24 RX ADMIN — SODIUM CHLORIDE, POTASSIUM CHLORIDE, SODIUM LACTATE AND CALCIUM CHLORIDE: 600; 310; 30; 20 INJECTION, SOLUTION INTRAVENOUS at 08:00

## 2018-07-24 RX ADMIN — MIDAZOLAM HYDROCHLORIDE 2 MG: 1 INJECTION, SOLUTION INTRAMUSCULAR; INTRAVENOUS at 07:32

## 2018-07-24 RX ADMIN — BUPIVACAINE HYDROCHLORIDE AND EPINEPHRINE BITARTRATE 13 ML: 5; .005 INJECTION, SOLUTION PERINEURAL at 08:59

## 2018-07-24 RX ADMIN — FENTANYL CITRATE 150 MCG: 50 INJECTION, SOLUTION INTRAMUSCULAR; INTRAVENOUS at 07:34

## 2018-07-24 RX ADMIN — FENTANYL CITRATE 50 MCG: 50 INJECTION INTRAMUSCULAR; INTRAVENOUS at 09:41

## 2018-07-24 RX ADMIN — ROCURONIUM BROMIDE 50 MG: 10 INJECTION INTRAVENOUS at 07:35

## 2018-07-24 RX ADMIN — DEXAMETHASONE SODIUM PHOSPHATE 8 MG: 4 INJECTION, SOLUTION INTRA-ARTICULAR; INTRALESIONAL; INTRAMUSCULAR; INTRAVENOUS; SOFT TISSUE at 07:35

## 2018-07-24 RX ADMIN — CEFAZOLIN SODIUM 2 G: 2 INJECTION, SOLUTION INTRAVENOUS at 07:45

## 2018-07-24 RX ADMIN — MIDAZOLAM HYDROCHLORIDE 3 MG: 1 INJECTION, SOLUTION INTRAMUSCULAR; INTRAVENOUS at 07:34

## 2018-07-24 RX ADMIN — PHENAZOPYRIDINE HYDROCHLORIDE 200 MG: 200 TABLET, COATED ORAL at 06:38

## 2018-07-24 RX ADMIN — ONDANSETRON 4 MG: 2 INJECTION INTRAMUSCULAR; INTRAVENOUS at 07:35

## 2018-07-24 RX ADMIN — FENTANYL CITRATE 100 MCG: 50 INJECTION, SOLUTION INTRAMUSCULAR; INTRAVENOUS at 07:32

## 2018-07-24 RX ADMIN — OXYCODONE HYDROCHLORIDE 5 MG: 5 TABLET ORAL at 10:52

## 2018-07-24 RX ADMIN — FENTANYL CITRATE 100 MCG: 50 INJECTION, SOLUTION INTRAMUSCULAR; INTRAVENOUS at 07:48

## 2018-07-24 RX ADMIN — Medication 1 KIT: at 08:49

## 2018-07-24 RX ADMIN — GLYCOPYRROLATE 0.6 MG: 0.2 INJECTION, SOLUTION INTRAMUSCULAR; INTRAVENOUS at 08:56

## 2018-07-24 RX ADMIN — FENTANYL CITRATE 150 MCG: 50 INJECTION, SOLUTION INTRAMUSCULAR; INTRAVENOUS at 07:51

## 2018-07-24 RX ADMIN — LIDOCAINE HYDROCHLORIDE 1 ML: 10 INJECTION, SOLUTION EPIDURAL; INFILTRATION; INTRACAUDAL; PERINEURAL at 06:38

## 2018-07-24 RX ADMIN — OXYCODONE HYDROCHLORIDE 5 MG: 5 TABLET ORAL at 11:56

## 2018-07-24 RX ADMIN — HYDROMORPHONE HYDROCHLORIDE 1 MG: 1 INJECTION, SOLUTION INTRAMUSCULAR; INTRAVENOUS; SUBCUTANEOUS at 08:54

## 2018-07-24 NOTE — DISCHARGE INSTRUCTIONS
Same Day Surgery Discharge Instructions  Special Precautions After Surgery - Adult    1. It is not unusual to feel lightheaded or faint, up to 24 hours after surgery or while taking pain medication.  If you have these symptoms; sit for a few minutes before standing and have someone assist you when getting up.  2. You should rest and relax for the next 24 hours and must have someone stay with you for at least 24 hours after your discharge.  3. DO NOT DRIVE any vehicle or operate mechanical equipment for 24 hours following the end of your surgery.  DO NOT DRIVE while taking narcotic pain medications that have been prescribed by your physician.  If you had a limb operated on, you must be able to use it fully to drive.  4. DO NOT drink alcoholic beverages for 24 hours following surgery or while taking prescription pain medication.  5. Drink clear liquids (apple juice, ginger ale, broth, 7-Up, etc.).  Progress to your regular diet as you feel able.  6. Any questions call your physician and do not make important decisions for 24 hours.    ACTIVITY  ? Rest today.  No activity or diet restrictions.  ? Resume activity as tolerated.  ? No lifting more than 10 pounds for 2 weeks.and no strenuous activity  ? See printed discharge sheet.     INCISIONAL CARE  ? May bathe / shower after 24 hours.  ? Keep incision dry for 24 hours.  ? Apply ice 1/2 hour on and 1/2 hour off for first 72 hours.  ? Be alert for signs of infection:  redness, swelling, heat, drainage of pus, and/or elevated temperature.  Contact your doctor if these occur.        Call for an appointment to return to the clinic dr hahn 3545 7/27/18    Medications:  ? Hydroxyzine (Vistaril):  Next dose: muscle relaxer as needed to help with postop pain, nausea and itching. may make you sleepy.5pm  ? Ibuprofen (Motrin, Advil):  Next dose: as needed  ? Stool softener to help prevent constipation while on pain pills with pain pills  ? Follow the  instructions on the bottle.  ? roxicodone pain pill as needed to stay ahead of the pain especially at bedtime next dose 4pm  ?   [id number] as directed     Additional discharge instructions: call with any questions and or concerns  __________________________________________________________________________________________________________________________________  IMPORTANT NUMBERS:    [facility] Main Number:  [phone], [phone]  Pharmacy:  [phone]  Same Day Surgery:  [phone], Monday - Friday until 8:30 p.m.  Urgent Care:  [phone]  Emergency Room:  [phone]      [facility]:  [phone]                                                                             [facility]:  [phone] option 1  [facility]:  [phone]     OB Clinic:  [phone]   Surgery Specialty Clinic:  [phone]   Home Medical Equipment: [phone]  [facility]:  [phone]

## 2018-07-24 NOTE — IP AVS SNAPSHOT
MRN:[id number]                      After Visit Summary   7/24/2018    Jessica Tovar    MRN: [id number]           Thank you!     Thank you for choosing [facility] for your care. Our goal is always to provide you with excellent care. Hearing back from our patients is one way we can continue to improve our services. Please take a few minutes to complete the written survey that you may receive in the mail after you visit with us. Thank you!        Patient Information     Date Of Birth          1980        About your hospital stay     You were admitted on:  July 24, 2018 You last received care in the:  [facility] PreOP/Phase II    You were discharged on:  July 24, 2018       Who to Call     For medical emergencies, please call 911.  For non-urgent questions about your medical care, please call your primary care provider or clinic, [phone]  For questions related to your surgery, please call your surgery clinic        Attending Provider     Provider Specialty    Molly Vo MD OB/Gyn       Primary Care Provider Office Phone # Fax #    Damaso Eddy [phone]      After Care Instructions     Discharge Instructions       Resume pre procedure diet            Discharge Instructions       Pelvic Rest. No tampons, douching or intercourse for  6  weeks.            Discharge Instructions       's office will contact patient to  Arrange followup visit            No alcohol       NO ALCOHOL for 24 hours post procedure            No driving or operating machinery       No driving or operating machinery until day after procedure            No lifting       No lifting over 10 pounds and no strenuous physical activity.  For 2 weeks                  Your next 10 appointments already scheduled     Jul 27, 2018 10:40 AM CDT   SHORT with Lazaro Huitron MD   [facility]    [address] [place] [address]  [place] [address]   [phone]              Further instructions from your care team                           Same Day Surgery Discharge Instructions  Special Precautions After Surgery - Adult    1. It is not unusual to feel lightheaded or faint, up to 24 hours after surgery or while taking pain medication.  If you have these symptoms; sit for a few minutes before standing and have someone assist you when getting up.  2. You should rest and relax for the next 24 hours and must have someone stay with you for at least 24 hours after your discharge.  3. DO NOT DRIVE any vehicle or operate mechanical equipment for 24 hours following the end of your surgery.  DO NOT DRIVE while taking narcotic pain medications that have been prescribed by your physician.  If you had a limb operated on, you must be able to use it fully to drive.  4. DO NOT drink alcoholic beverages for 24 hours following surgery or while taking prescription pain medication.  5. Drink clear liquids (apple juice, ginger ale, broth, 7-Up, etc.).  Progress to your regular diet as you feel able.  6. Any questions call your physician and do not make important decisions for 24 hours.    ACTIVITY  ? Rest today.  No activity or diet restrictions.  ? Resume activity as tolerated.  ? No lifting more than 10 pounds for 2 weeks.and no strenuous activity  ? See printed discharge sheet.     INCISIONAL CARE  ? May bathe / shower after 24 hours.  ? Keep incision dry for 24 hours.  ? Apply ice 1/2 hour on and 1/2 hour off for first 72 hours.  ? Be alert for signs of infection:  redness, swelling, heat, drainage of pus, and/or elevated temperature.  Contact your doctor if these occur.        Call for an appointment to return to the clinic dr hahn 9510 7/27/18    Medications:  ? Hydroxyzine (Vistaril):  Next dose: muscle relaxer as needed to help with postop pain, nausea and itching. may make you sleepy.5pm  ? Ibuprofen (Motrin, Advil):  Next dose: as needed  ? Stool  softener to help prevent constipation while on pain pills with pain pills  ? Follow the instructions on the bottle.  ? roxicodone pain pill as needed to stay ahead of the pain especially at bedtime next dose 4pm  ?   [id number] as directed     Additional discharge instructions: call with any questions and or concerns  __________________________________________________________________________________________________________________________________  IMPORTANT NUMBERS:    [facility] Main Number:  [phone], [phone]  Pharmacy:  [phone]  Same Day Surgery:  [phone], Monday - Friday until 8:30 p.m.  Urgent Care:  [phone]  Emergency Room:  [phone]      [facility]:  [phone]                                                                             [facility]:  [phone] option 1  [facility]:  [phone]     OB Clinic:  [phone]   Surgery Specialty Clinic:  [phone]   Home Medical Equipment: [phone]  [facility]:  [phone]        Pending Results     Date and Time Order Name Status Description    7/24/2018 0859 Surgical pathology exam In process             Admission Information     Date & Time Provider Department Dept. Phone    7/24/2018 Molly Vo MD [facility] PreOP/Phase -[phone]      Your Vitals Were     Blood Pressure Temperature Respirations Last Period Pulse Oximetry       104/60 96.8  F (36  C) (Oral) 16 07/23/2018 95%       MyChart Information     [employer] gives you secure access to your electronic health record. If you see a primary care provider, you can also send messages to your care team and make appointments. If you have questions, please call your primary care clinic.  If you do not have a primary care provider, please call [phone] and they will assist you.        Care EveryWhere ID     This is your Care EveryWhere ID. This could be used by other organizations to access your  [facility] medical records  [id number]        Equal Access to Services     TARAS JENNIE : Hadii aad jason marycarmenjuan carlos Solar[employer], waminada luqadaha, qajaisonta kaalmada nickhortenciapadma, hardik khanhongjavier sierra. So [facility] [phone].    ATENCIÓN: Si habla español, tiene a fisher disposición servicios gratuitos de asistencia lingüística. Llame al [phone].    We comply with applicable federal civil rights laws and [place] laws. We do not discriminate on the basis of race, color, national origin, age, disability, sex, sexual orientation, or gender identity.               Review of your medicines      START taking        Dose / Directions    estradiol 2 MG tablet   Commonly known as:  ESTRACE   Used for:  S/P laparoscopic assisted vaginal hysterectomy (LAVH)        Dose:  2 mg   Take 1 tablet (2 mg) by mouth daily   Quantity:  90 tablet   Refills:  3       ibuprofen 600 MG tablet   Commonly known as:  ADVIL/MOTRIN   Used for:  S/P laparoscopic assisted vaginal hysterectomy (LAVH)        Dose:  600 mg   Take 1 tablet (600 mg) by mouth every 6 hours as needed for pain (mild)   Quantity:  30 tablet   Refills:  1       ondansetron 4 MG ODT tab   Commonly known as:  ZOFRAN-ODT   Used for:  S/P laparoscopic assisted vaginal hysterectomy (LAVH)        Dose:  4-8 mg   Take 1-2 tablets (4-8 mg) by mouth every 8 hours as needed for nausea Dissolve ON the tongue.   Quantity:  10 tablet   Refills:  0       oxyCODONE IR 5 MG tablet   Commonly known as:  ROXICODONE   Used for:  S/P laparoscopic assisted vaginal hysterectomy (LAVH)        Dose:  5-10 mg   Take 1-2 tablets (5-10 mg) by mouth every 3 hours as needed for pain or other (Moderate to Severe)   Quantity:  30 tablet   Refills:  0       senna-docusate 8.6-50 MG per tablet   Commonly known as:  SENOKOT-S;PERICOLACE   Used for:  S/P laparoscopic assisted vaginal hysterectomy (LAVH)        Dose:  1-2 tablet   Take 1-2 tablets by mouth 2 times daily Take while on oral narcotics to  prevent or treat constipation.   Quantity:  30 tablet   Refills:  1         CONTINUE these medicines which have NOT CHANGED        Dose / Directions    ALEVE 220 MG tablet   Generic drug:  naproxen sodium        Reported on 5/10/2017   Refills:  0       escitalopram 10 MG tablet   Commonly known as:  LEXAPRO        Dose:  10 mg   Take 10 mg by mouth daily   Refills:  0       lisinopril 2.5 MG tablet   Commonly known as:  PRINIVIL/Zestril   Used for:  Benign essential hypertension        Dose:  2.5 mg   Take 1 tablet (2.5 mg) by mouth daily   Quantity:  30 tablet   Refills:  1            Where to get your medicines      These medications were sent to [id number] - [place], [place] - [address]  [address] [facility], [facility] [address]    Hours:  Formerly Snydmiguel a - jayme unchanged   9/8/03 kr Phone:  [phone]     estradiol 2 MG tablet    ibuprofen 600 MG tablet    ondansetron 4 MG ODT tab    senna-docusate 8.6-50 MG per tablet         Some of these will need a paper prescription and others can be bought over the counter. Ask your nurse if you have questions.     Bring a paper prescription for each of these medications     oxyCODONE IR 5 MG tablet                Protect others around you: Learn how to safely use, store and throw away your medicines at www.disposemymeds.org.        Information about OPIOIDS     PRESCRIPTION OPIOIDS: WHAT YOU NEED TO KNOW   We gave you an opioid (narcotic) pain medicine. It is important to manage your pain, but opioids are not always the best choice. You should first try all the other options your care team gave you. Take this medicine for as short a time (and as few doses) as possible.     These medicines have risks:    DO NOT drive when on new or higher doses of pain medicine. These medicines can affect your alertness and reaction times, and you could be arrested for driving under the influence (DUI). If you need to use opioids long-term, talk to your care team about driving.    DO  NOT operate heave machinery    DO NOT do any other dangerous activities while taking these medicines.     DO NOT drink any alcohol while taking these medicines.      If the opioid prescribed includes acetaminophen, DO NOT take with any other medicines that contain acetaminophen. Read all labels carefully. Look for the word  acetaminophen  or  Tylenol.  Ask your pharmacist if you have questions or are unsure.    You can get addicted to pain medicines, especially if you have a history of addiction (chemical, alcohol or substance dependence). Talk to your care team about ways to reduce this risk.    Store your pills in a secure place, locked if possible. We will not replace any lost or stolen medicine. If you don t finish your medicine, please throw away (dispose) as directed by your pharmacist. The [place] Pollution Control Agency has more information about safe disposal: https://www.pca.[facility].[place].us/living-green/managing-unwanted-medications.     All opioids tend to cause constipation. Drink plenty of water and eat foods that have a lot of fiber, such as fruits, vegetables, prune juice, apple juice and high-fiber cereal. Take a laxative (Miralax, milk of magnesia, Colace, Senna) if you don t move your bowels at least every other day.              Medication List: This is a list of all your medications and when to take them. Check marks below indicate your daily home schedule. Keep this list as a reference.      Medications           Morning Afternoon Evening Bedtime As Needed    ALEVE 220 MG tablet   Reported on 5/10/2017   Generic drug:  naproxen sodium                                escitalopram 10 MG tablet   Commonly known as:  LEXAPRO   Take 10 mg by mouth daily                                estradiol 2 MG tablet   Commonly known as:  ESTRACE   Take 1 tablet (2 mg) by mouth daily                                ibuprofen 600 MG tablet   Commonly known as:  ADVIL/MOTRIN   Take 1 tablet (600 mg) by mouth every 6  hours as needed for pain (mild)                                lisinopril 2.5 MG tablet   Commonly known as:  PRINIVIL/Zestril   Take 1 tablet (2.5 mg) by mouth daily                                ondansetron 4 MG ODT tab   Commonly known as:  ZOFRAN-ODT   Take 1-2 tablets (4-8 mg) by mouth every 8 hours as needed for nausea Dissolve ON the tongue.                                oxyCODONE IR 5 MG tablet   Commonly known as:  ROXICODONE   Take 1-2 tablets (5-10 mg) by mouth every 3 hours as needed for pain or other (Moderate to Severe)   Last time this was given:  5 mg on 7/24/2018 11:56 AM                                senna-docusate 8.6-50 MG per tablet   Commonly known as:  SENOKOT-S;PERICOLACE   Take 1-2 tablets by mouth 2 times daily Take while on oral narcotics to prevent or treat constipation.

## 2018-07-24 NOTE — OR NURSING
preop meds given as per dr order. Lab here to draw blood for labs as per order. hcg blood obtained as pt has period and urine is too bloody per iraida noland with pt preop and ready for surgery.

## 2018-07-24 NOTE — OR NURSING
in to see pt postop and ludin not in the building but called and aware of pt in sds 1 and will come back to hospital a bit later. Will let rest. wilberto foof and fluids. And no further c/o. Report to reuben nuñez rn

## 2018-07-24 NOTE — H&P (VIEW-ONLY)
[address] [address]  [phone]  Dept: [phone]    PRE-OP EVALUATION:  Today's date: 2018    Jessica Tovar (: 1980) presents for pre-operative evaluation assessment as requested by Dr. Jonas.  She requires evaluation and anesthesia risk assessment prior to undergoing surgery/procedure .    Fax number for surgical facility: [phone]  Primary Physician: Damaso Eddy  Type of Anesthesia Anticipated: General    Patient has a Health Care Directive or Living Will:  NO    Preop Questions 2018   Who is doing your surgery? lazaro jonas   What are you having done? hysterectomy   Date of Surgery/Procedure: 2018   Facility or Hospital where procedure/surgery will be performed: [facility]    1.  Do you have a history of Heart attack, stroke, stent, coronary bypass surgery, or other heart surgery? No   2.  Do you ever have any pain or discomfort in your chest? No   3.  Do you have a history of  Heart Failure? No   4.   Are you troubled by shortness of breath when:  walking on a level surface, or up a slight hill, or at night? UNKNOWN - Age related   5.  Do you currently have a cold, bronchitis or other respiratory infection? No   6.  Do you have a cough, shortness of breath, or wheezing? No   7.  Do you sometimes get pains in the calves of your legs when you walk? UNKNOWN - age related   8. Do you or anyone in your family have previous history of blood clots? No   9.  Do you or does anyone in your family have a serious bleeding problem such as prolonged bleeding following surgeries or cuts? No   10. Have you ever had problems with anemia or been told to take iron pills? YES - after    11. Have you had any abnormal blood loss such as black, tarry or bloody stools, or abnormal vaginal bleeding? YES - Periods   12. Have you ever had a blood transfusion? YES -  with  and  with D&C    13. Have you or any of your relatives ever had problems with anesthesia? No   14. Do you have sleep apnea, excessive snoring or daytime drowsiness? No   15. Do you have any prosthetic heart valves? No   16. Do you have prosthetic joints? No   17. Is there any chance that you may be pregnant? No       Preoperative History and Physical    Chief complaint/indicated for surgery/planned surgery:    Jessica is planning to undergo hysterectomy by Dr Vo.    Past Medical History:   Diagnosis Date     Migraine without aura, with intractable migraine, so stated, without mention of status migrainosus     Hx of migraines. Takes Midrin prn migraine per Dr. Eddy.     Varicella without mention of complication     Had chicken pox as a child ages 3 and 8     Current Outpatient Prescriptions   Medication Sig Dispense Refill     ALEVE 220 MG OR TABS Reported on 5/10/2017       escitalopram (LEXAPRO) 10 MG tablet Take 10 mg by mouth daily       lisinopril (PRINIVIL/ZESTRIL) 2.5 MG tablet Take 1 tablet (2.5 mg) by mouth daily 30 tablet 1       There has been no recent use of OTC or herbal medications. (except zantac-)  The patient denies any recent ASA or NSAID use.   The patient denies any recent steroid use within the last 6 months.   The patient denies any alcohol or drug use.     Allergies   Allergen Reactions     Ofloxacin      floxin     History   Smoking Status     Former Smoker     Packs/day: 0.25     Years: 6.00     Types: Cigarettes     Quit date: 8/15/2001   Smokeless Tobacco     Never Used     Comment: Stopped smoking when found out pg again.     Past Surgical History:   Procedure Laterality Date     C  DELIVERY ONLY  10/27/99     C  DELIVERY ONLY  02    BTL at the time of      HC DILATION/CURETTAGE DIAG/THER NON OB  2001     HC REMOVE TONSILS/ADENOIDS,<11 Y/O       Social History     Social History     Marital status: Single     Spouse name: N/A     Number of children: 2      Years of education: N/A     Occupational History     Day care 0unemployed     Social History Main Topics     Smoking status: Former Smoker     Packs/day: 0.25     Years: 6.00     Types: Cigarettes     Quit date: 8/15/2001     Smokeless tobacco: Never Used      Comment: Stopped smoking when found out pg again.     Alcohol use Yes      Comment: Would drink 3 to 4  per month.     Drug use: No     Sexual activity: Yes     Partners: Male      Comment: Was not using any form of birth control.     Other Topics Concern     Not on file     Social History Narrative     Family History   Problem Relation Age of Onset     Diabetes Mother      Depression Mother      Alcohol/Drug Father      ETOH     Diabetes Father      Depression Sister      older     Arthritis Maternal Grandmother      HEART DISEASE Paternal Grandfather      Diabetes Brother      younger     Other - See Comments Son      10/27/1999     Other - See Comments Son      05/08/2002       There is no family history of anesthesia reactions or malignant hyperthermia or psevdocholinestergse problem or porphyria.    Review Of Systems  Skin: negative  Eyes: negative  Ears/Nose/Throat: negative  Respiratory: No shortness of breath, dyspnea on exertion, cough, or hemoptysis  Cardiovascular: negative  Gastrointestinal: negative  Genitourinary: negative  Musculoskeletal: negative  Neurologic: negative  Psychiatric: negative  Hematologic/Lymphatic/Immunologic: negative  Endocrine: negative    Physical Exam:/88  Pulse 86  Temp 97  F (36.1  C) (Tympanic)  Resp 16  Wt 190 lb (86.2 kg)  LMP 06/28/2018  SpO2 100%  Breastfeeding? No  BMI 29.67 kg/m2    HEENT:    Sclerae and conjunctiva are normal.   Ear canals and TMs look normal.  Nasal mucosa is pink  - no polyps or masses seen.  sinuses are non tender to palpation.  Throat is unremarkable . Mucous membranes are moist.     Neck is supple, mobile, no adenopathy or masses palpable. The thyroid feels normal.   Normal  range of motion noted.    Chest is clear to auscultation.  No wheezes, rales or rhonchi heard.  Cardiac exam is normal with s1, s2, no murmurs or adventitious sounds.Normal rate and rhythm is heard.     Abdomen is soft,  nondistended, No masses felt.No HSM. No guarding or rigidity or rebound   noted. Palpation reveals  no    tenderness   Normal bowel sounds heard.     Extremities are pink and there is no cyanosis and there is no edema. Pulses are physiologic.    Motor and sensory exams are grossly normal. Cranial nerves 2-12 are intact. Cerebellar exam is normal.         Other:  Labs: cbc on 6/19/18 was normal  basic panel on  6/28/18 was normal    Assessment/Impression:  1.menorrhagia- planning to have laparoscopic assisted vaginal hysterectomy by Dr. Vo next week-    2.Preoperative  Examination: The patient is at LOW   risk for general anesthesia for the proposed surgery.      3. Benign essential hypertension- controlled with lisinopril- she was advised to hold that medication on the morning of surgery and if her BP is high it can be managed by anesthetist-    4. GERD- controlled with zantac-stable    Recommendations:  Approval given for general anesthesia.    Medications are to be stopped before surgery.   Discontinue ASA and NSAIDS 5 days prior to surgery to reduce bleeding risk.            ARIANNE Huitron MD

## 2018-07-24 NOTE — INTERVAL H&P NOTE
H & P reviewed; no interval change; discussed risks and benefits of LAPAROSCOPIC ASSISTED VAGINAL HYSTERECTOMY ; she understands fully that there is a possibility of having to complete the surgery through her abdominal incision, a possibility that she would need to be sent home with a kumari catheter, possibility that we would encounter something unexpected that could necessitate removal of one or both of her ovaries ; she is amenable and affirmed her informed consent for scheduled procedure  Molly Vo MD  [facility]

## 2018-07-24 NOTE — ANESTHESIA PREPROCEDURE EVALUATION
Anesthesia Evaluation     . Pt has had prior anesthetic.     No history of anesthetic complications          ROS/MED HX    ENT/Pulmonary:       Neurologic:     (+)migraines,     Cardiovascular:     (+) hypertension----. : . . . :. .       METS/Exercise Tolerance:  >4 METS   Hematologic:  - neg hematologic  ROS       Musculoskeletal:  - neg musculoskeletal ROS       GI/Hepatic:  - neg GI/hepatic ROS       Renal/Genitourinary:  - ROS Renal section negative       Endo:  - neg endo ROS       Psychiatric:  - neg psychiatric ROS       Infectious Disease:  - neg infectious disease ROS       Malignancy:         Other:                     Physical Exam  Normal systems: cardiovascular, pulmonary and dental    Airway   Mallampati: I  TM distance: >3 FB  Neck ROM: full    Dental     Cardiovascular   Rhythm and rate: regular and normal      Pulmonary    breath sounds clear to auscultation                    Anesthesia Plan      History & Physical Review  History and physical reviewed and following examination; no interval change.    ASA Status:  2 .    NPO Status:  > 6 hours    Plan for General and ETT with Intravenous induction. Maintenance will be Balanced.    PONV prophylaxis:  Ondansetron (or other 5HT-3), Dexamethasone or Solumedrol and Scopolamine patch       Postoperative Care      Consents  Anesthetic plan, risks, benefits and alternatives discussed with:  Patient..                          .

## 2018-07-24 NOTE — OP NOTE
Procedure Date: 2018      PREOPERATIVE DIAGNOSES:   1.  Menorrhagia.   2.  Dysmenorrhea.      POSTOPERATIVE DIAGNOSES:   1.  Menorrhagia.   2.  Dysmenorrhea.   3.  Pelvic endometriosis.      PROCEDURE PERFORMED:     1.  Laparoscopically-assisted vaginal hysterectomy with bilateral salpingo-oophorectomy.   2.  Cystoscopy.      OPERATING SURGEON:  Molly Vo MD      ASSISTANT:  Lazaro Huitron MD      ANESTHESIA:  General.      FINDINGS:  There was evidence of scarring of the lower uterine segment from prior  sections. Multiple foci of endometriosis were seen studding the superficial and deep cul-de-sac, as well as within the broad ligament and on both ovaries.  Because of the presence of endometriosis, it was determined that the ovaries would be removed concurrently with the uterus due to preoperative pelvic pain being an indication for surgery.      DESCRIPTION OF PROCEDURE:  After assuring informed consent, the patient was taken to the operating suite, where a general anesthetic was induced.  The patient was placed in the dorsal lithotomy position.  The abdomen and vagina were sterilely prepped and draped.  A timeout and fire safety assessment was performed.  A speculum was placed in the vagina, a Kronner uterine manipulator within the cavity for uterine manipulation and a Zazueta catheter for bladder drainage.  A 1 cm infraumbilical skin incision was made with a knife with elevation of the anterior abdominal wall.  A 5 mm trocar was advanced into the abdominal cavity.  After assuring its intraperitoneal location, the abdomen was insufflated with carbon dioxide until fully distended.  Secondary trocars, 5 mm in size, were placed in the right and left lower quadrants, both under direct visualization.  The patient was then placed in Trendelenburg position and the pelvis was inspected.  At this time, because of the presence of significant endometriosis, it was determined that the ovaries would need  to be removed concurrently with uterus.  Utilizing a LigaSure cautery cut device, the infundibulopelvic ligaments were crossclamped, ligated and divided.  This was carried down to the medial aspect of the broad ligaments, the round ligaments, to both cardinal ligaments on the right and left.  A bladder flap was created sharply over the lower uterine segment and then the bladder was bluntly dissected off the cervix.  Once this was accomplished, the legs were placed in high lithotomy position and the cervix regrasped with Sebastien clamp.  Cervicovaginal junction was then injected with dilute Pitressin, incised circumferentially, and with sharp and blunt dissection, the anterior and posterior cul-de-sacs were entered without difficulty.  The residual uterosacral ligaments were crossclamped, ligated and divided with the LigaSure cautery cut device.  In a similar fashion, the residual cardinals were for cross-clamped, ligated and divided to the uterus and attached adnexa could be removed from the field.  The pedicle lines appeared hemostatic.  The vaginal cuff was then closed in a running locking fashion with Vicryl suture.  Legs then placed in low lithotomy position, the gloves were changed and the abdomen reinsufflated carbon dioxide.  The pelvis was thoroughly irrigated and inspected for hemostasis.  Utilizing bipolar cautery, areas of mild bleeding along the cuff and pedicle lines were cauterized and then a generous application of FloSeal applied.  The carbon dioxide was then allowed to evacuate from the abdomen, trocars were removed, and the incisions closed in a subcuticular fashion with 4-0 Monocryl suture and Dermabond.  The Zazueta catheter was removed and a 70-degree cystoscope was then placed within the bladder.  The bladder was gently distended and clear efflux of Pyridium stained urine could be seen from both ureteral orifices and the bladder was intact.  The bladder was then drained and the Zazueta catheter  remained out.  The patient then awakened and taken to postanesthesia recovery in stable condition.      ESTIMATED BLOOD LOSS:  100 mL.      SPECIMENS TO LAB:  Uterus, cervix and bilateral tubes and ovaries.         JOYCELYN MOURA MD             D: 2018   T: 2018   MT: BUCKY      Name:     AMPARO ANDUJAR   MRN:      [id number]        Account:        [id number]   :      1980           Procedure Date: 2018      Document: [id number]

## 2018-07-24 NOTE — OR NURSING
To sds. Vss. Iv patent. Denies increase in abd pain. But roxicodone and vistaril given postop.wilberto well. Eats and drinks. Has a dry mouth but no n/v. Will let rest and call ludin. Will reassess.

## 2018-07-24 NOTE — BRIEF OP NOTE
[facility] Gynecology Brief Operative Note    Pre-operative diagnosis: removal of uterus to treat heavy periods   Post-operative diagnosis: Menorrhagia  Pelvic pain  endometriosis   Procedure: LAPAROSCOPIC ASSISTED VAGINAL HYSTERECTOMY_BSO  Cystoscopy   Surgeon: Molly Vo MD   Assistant(s): Elana   Anesthesia: General Endotracheal Anesthesia   Estimated blood loss: 100ml   Total IV fluids: (See anesthesia record)   Blood transfusion: No transfusion was given during surgery   Total urine output: (See anesthesia record)   Drains: None   Specimens: Uterus, cervix, bilateral tubes and ovaries   Findings: endometriosis   Complications: None   Condition: Stable   Comments: See dictated operative report for full details

## 2018-07-24 NOTE — IP AVS SNAPSHOT
[facility] PreOP/Phase II    [address] [address]    Phone:  [phone]    Fax:  [phone]                                       After Visit Summary   7/24/2018    Jessica Tovar    MRN: [id number]           After Visit Summary Signature Page     I have received my discharge instructions, and my questions have been answered. I have discussed any challenges I see with this plan with the nurse or doctor.    ..........................................................................................................................................  Patient/Patient Representative Signature      ..........................................................................................................................................  Patient Representative Print Name and Relationship to Patient    ..................................................               ................................................  Date                                            Time    ..........................................................................................................................................  Reviewed by Signature/Title    ...................................................              ..............................................  Date                                                            Time

## 2018-07-24 NOTE — ANESTHESIA POSTPROCEDURE EVALUATION
Patient: Jessica Tovar    Procedure(s):  Laparoscopic Assisted Vaginal Hysterectomy with BSO, cystoscopy - Wound Class: II-Clean Contaminated    Diagnosis:removal of uterus to treat heavy periods  Diagnosis Additional Information: No value filed.    Anesthesia Type:  No value filed.    Note:  Anesthesia Post Evaluation    Patient location during evaluation: Bedside  Patient participation: Able to fully participate in evaluation  Level of consciousness: awake and alert  Pain management: adequate  Airway patency: patent  Cardiovascular status: acceptable  Respiratory status: acceptable  Hydration status: acceptable  PONV: none     Anesthetic complications: None          Last vitals:  Vitals:    07/24/18 0622   BP: 132/87   Resp: 16   Temp: 37.1  C (98.7  F)   SpO2: 98%         Electronically Signed By: SISI La CRNA  July 24, 2018  9:15 AM

## 2018-07-24 NOTE — OR NURSING
Second roxicodone given. wilberto well. Just is resting. wilberto well. No further c/o, abd soft. Sips and no n/v,

## 2018-07-27 ENCOUNTER — TELEPHONE (OUTPATIENT)
Dept: FAMILY MEDICINE | Facility: CLINIC | Age: 38
End: 2018-07-27

## 2018-07-27 LAB — COPATH REPORT: NORMAL

## 2018-07-27 NOTE — TELEPHONE ENCOUNTER
Patient needs appt for post op. She was scheduled for today (07/27) but cancelled. It is important for us to see her following her surgery. Will attempt call again as unable to LM due to full voicemail box.  Dania Sinclair CMA

## 2018-07-30 NOTE — TELEPHONE ENCOUNTER
spoke to patient, she has scheduled an appt for next week. She did not have a ride to get here any sooner, per patient she is feeling well. Urinating and having normal bowel movements. No fevers or acute pain.  Dania Sinclair CMA

## 2018-08-07 ENCOUNTER — OFFICE VISIT (OUTPATIENT)
Dept: FAMILY MEDICINE | Facility: CLINIC | Age: 38
End: 2018-08-07
Payer: COMMERCIAL

## 2018-08-07 VITALS
RESPIRATION RATE: 16 BRPM | BODY MASS INDEX: 29.67 KG/M2 | WEIGHT: 190 LBS | SYSTOLIC BLOOD PRESSURE: 108 MMHG | TEMPERATURE: 49.5 F | DIASTOLIC BLOOD PRESSURE: 76 MMHG | HEART RATE: 80 BPM | OXYGEN SATURATION: 100 %

## 2018-08-07 DIAGNOSIS — R30.0 DYSURIA: ICD-10-CM

## 2018-08-07 DIAGNOSIS — Z09 POSTOPERATIVE EXAMINATION: Primary | ICD-10-CM

## 2018-08-07 LAB
ALBUMIN UR-MCNC: NEGATIVE MG/DL
APPEARANCE UR: ABNORMAL
BILIRUB UR QL STRIP: NEGATIVE
COLOR UR AUTO: YELLOW
GLUCOSE UR STRIP-MCNC: NEGATIVE MG/DL
HGB UR QL STRIP: NEGATIVE
KETONES UR STRIP-MCNC: NEGATIVE MG/DL
LEUKOCYTE ESTERASE UR QL STRIP: ABNORMAL
MUCOUS THREADS #/AREA URNS LPF: PRESENT /LPF
NITRATE UR QL: NEGATIVE
PH UR STRIP: 6 PH (ref 5–7)
RBC #/AREA URNS AUTO: 1 /HPF (ref 0–2)
SOURCE: ABNORMAL
SP GR UR STRIP: 1.02 (ref 1–1.03)
SQUAMOUS #/AREA URNS AUTO: 3 /HPF (ref 0–1)
UROBILINOGEN UR STRIP-MCNC: 0 MG/DL (ref 0–2)
WBC #/AREA URNS AUTO: 36 /HPF (ref 0–5)

## 2018-08-07 PROCEDURE — 81001 URINALYSIS AUTO W/SCOPE: CPT | Performed by: OBSTETRICS & GYNECOLOGY

## 2018-08-07 PROCEDURE — 99024 POSTOP FOLLOW-UP VISIT: CPT | Performed by: OBSTETRICS & GYNECOLOGY

## 2018-08-07 PROCEDURE — 87088 URINE BACTERIA CULTURE: CPT | Performed by: OBSTETRICS & GYNECOLOGY

## 2018-08-07 PROCEDURE — 87186 SC STD MICRODIL/AGAR DIL: CPT | Performed by: OBSTETRICS & GYNECOLOGY

## 2018-08-07 PROCEDURE — 87086 URINE CULTURE/COLONY COUNT: CPT | Performed by: OBSTETRICS & GYNECOLOGY

## 2018-08-07 RX ORDER — SULFAMETHOXAZOLE/TRIMETHOPRIM 800-160 MG
1 TABLET ORAL 2 TIMES DAILY
Qty: 10 TABLET | Refills: 0 | Status: SHIPPED | OUTPATIENT
Start: 2018-08-07 | End: 2018-08-12

## 2018-08-07 ASSESSMENT — PAIN SCALES - GENERAL: PAINLEVEL: MILD PAIN (2)

## 2018-08-07 NOTE — PROGRESS NOTES
S: The patient is here for 1st postop check from laparoscopic vaginal hysterectomy. She reports normal bowel and bladder function and pain control is adequate. No fevers or other complaints.  O: VSS as shown in chart /76  Pulse 80  Temp (!) 49.5  F (9.7  C) (Temporal)  Resp 16  Wt 190 lb (86.2 kg)  LMP 07/23/2018  SpO2 100%  Breastfeeding? No  BMI 29.67 kg/m2       Chest is clear to auscultation and cardiac exam is normal. Abdomen is soft, nondistended, and the incisions are clean, dry, and intact, and healing well. Normal bowel sounds are heard.    A: stable post op check   She has mild dysuria today- will check UA-    P: laparoscopy findings discussed with patient. She'll follow up in about 4  weeks and she is advised to recheck acutely if the incisions become red or discarge noted or any sx of infection or  nausea, vomiting, or pain.       ARIANNE Huitron MD      Addendum: the UA suggests UTI- See rx for  Bactrim-. I explained that antibiotics can cause a rash or allergic reaction to develop and so the medication should be stopped if this occurs. Also there is a risk of diarrhea or clostridium difficile pseudomembranous enterocolitis with any antibiotic use so it should be stopped if diarrhea develops and then the clinic should be called so that we have a followup evaluation.    rechekc next week if sx persist- sooner if worse.

## 2018-08-07 NOTE — MR AVS SNAPSHOT
After Visit Summary   8/7/2018    Jessica Tovar    MRN: [id number]           Patient Information     Date Of Birth          1980        Visit Information        Provider Department      8/7/2018 1:20 PM Lazaro Huitron MD [facility]        Today's Diagnoses     Postoperative examination    -  1    Dysuria           Follow-ups after your visit        Your next 10 appointments already scheduled     Sep 04, 2018 10:50 AM CDT   SHORT with Lazaro Huitron MD   [facility] ([facility]    [address] [place] [id number] [phone]              Who to contact     If you have questions or need follow up information about today's clinic visit or your schedule please contact [facility] directly at [phone].  Normal or non-critical lab and imaging results will be communicated to you by MyChart, letter or phone within 4 business days after the clinic has received the results. If you do not hear from us within 7 days, please contact the clinic through MyChart or phone. If you have a critical or abnormal lab result, we will notify you by phone as soon as possible.  Submit refill requests through [employer] or call your pharmacy and they will forward the refill request to us. Please allow 3 business days for your refill to be completed.          Additional Information About Your Visit        MyChart Information     [employer] gives you secure access to your electronic health record. If you see a primary care provider, you can also send messages to your care team and make appointments. If you have questions, please call your primary care clinic.  If you do not have a primary care provider, please call [phone] and they will assist you.        Care EveryWhere ID     This is your Care EveryWhere ID. This could be used by other organizations to access your [facility] medical records  [id number]        Your  Vitals Were     Pulse Temperature Respirations Last Period Pulse Oximetry Breastfeeding?    80 49.5  F (9.7  C) (Temporal) 16 07/23/2018 100% No    BMI (Body Mass Index)                   29.67 kg/m2            Blood Pressure from Last 3 Encounters:   08/07/18 108/76   07/24/18 96/66   07/19/18 136/88    Weight from Last 3 Encounters:   08/07/18 190 lb (86.2 kg)   07/19/18 190 lb (86.2 kg)   06/28/18 190 lb (86.2 kg)              We Performed the Following     *UA reflex to Microscopic and Culture ([place]; [facility]; [facility]; [facility]; [facility]; [facility]; [place]; [place])        Primary Care Provider Office Phone # Fax #    Damaso Eddy [phone]       [address] [place] [address]        Equal Access to Services     TARAS SCHNEIDER : Hadii dawood ku hadasho Soomaali, waaxda luqadaha, qaybta kaalmada adeegyada, waxay thangin hayita velarde . So [facility] [phone].    ATENCIÓN: Si habla español, tiene a fisher disposición servicios gratuitos de asistencia lingüística. Llame al [phone].    We comply with applicable federal civil rights laws and [place] laws. We do not discriminate on the basis of race, color, national origin, age, disability, sex, sexual orientation, or gender identity.            Thank you!     Thank you for choosing [facility]  for your care. Our goal is always to provide you with excellent care. Hearing back from our patients is one way we can continue to improve our services. Please take a few minutes to complete the written survey that you may receive in the mail after your visit with us. Thank you!             Your Updated Medication List - Protect others around you: Learn how to safely use, store and throw away your medicines at www.disposemymeds.org.          This list is accurate as of 8/7/18  1:37 PM.  Always use your most recent med list.                   Brand Name Dispense Instructions for use  Diagnosis    ALEVE 220 MG tablet   Generic drug:  naproxen sodium      Reported on 5/10/2017        escitalopram 10 MG tablet    LEXAPRO     Take 10 mg by mouth daily        estradiol 2 MG tablet    ESTRACE    90 tablet    Take 1 tablet (2 mg) by mouth daily    S/P laparoscopic assisted vaginal hysterectomy (LAVH)       ibuprofen 600 MG tablet    ADVIL/MOTRIN    30 tablet    Take 1 tablet (600 mg) by mouth every 6 hours as needed for pain (mild)    S/P laparoscopic assisted vaginal hysterectomy (LAVH)       lisinopril 2.5 MG tablet    PRINIVIL/Zestril    30 tablet    Take 1 tablet (2.5 mg) by mouth daily    Benign essential hypertension

## 2018-08-09 LAB
BACTERIA SPEC CULT: ABNORMAL
Lab: ABNORMAL
SPECIMEN SOURCE: ABNORMAL

## 2018-08-11 ENCOUNTER — NURSE TRIAGE (OUTPATIENT)
Dept: NURSING | Facility: CLINIC | Age: 38
End: 2018-08-11

## 2018-08-11 ENCOUNTER — TELEPHONE (OUTPATIENT)
Dept: OBGYN | Facility: CLINIC | Age: 38
End: 2018-08-11

## 2018-08-11 NOTE — TELEPHONE ENCOUNTER
Requests message sent to PCP see; telephone  Encounter   Mayelin Wagoner RN  FNA    Additional Information    [1] Follow-up call to recent contact AND [2] information only call, no triage required    Protocols used: INFORMATION ONLY CALL-ADULT-

## 2018-08-11 NOTE — TELEPHONE ENCOUNTER
Caller  Would like to leave message for Dr. Huitron; states that she would like him to go ahead and change her prescription and she will pick it up later    Message will be routed to provider  Via [employer] with high priority   Mayelin Wagoner RN  FNA

## 2018-08-13 RX ORDER — CEPHALEXIN 500 MG/1
500 CAPSULE ORAL 3 TIMES DAILY
Qty: 15 CAPSULE | Refills: 0 | COMMUNITY
Start: 2018-08-13 | End: 2021-03-26

## 2018-08-13 NOTE — TELEPHONE ENCOUNTER
Per RM patient to change antibiotics to Cephalexin 500 mg TID # 15. RX called to [facility] in [place]. See entered RX. Detailed message left for patient on voicemail, ok per chart  Dania Sinclair CMA

## 2019-12-08 ENCOUNTER — HEALTH MAINTENANCE LETTER (OUTPATIENT)
Age: 39
End: 2019-12-08

## 2020-03-15 ENCOUNTER — HEALTH MAINTENANCE LETTER (OUTPATIENT)
Age: 40
End: 2020-03-15

## 2021-03-23 NOTE — PATIENT INSTRUCTIONS
Preventive Health Recommendations  Female Ages 40 to 49    Yearly exam:     See your health care provider every year in order to  1. Review health changes.   2. Discuss preventive care.    3. Review your medicines if your doctor prescribed any.      Get a Pap test every three years (unless you have an abnormal result and your provider advises testing more often).      If you get Pap tests with HPV test, you only need to test every 5 years, unless you have an abnormal result. You do not need a Pap test if your uterus was removed (hysterectomy) and you have not had cancer.      You should be tested each year for STDs (sexually transmitted diseases), if you're at risk.     Ask your doctor if you should have a mammogram.      Have a colonoscopy (test for colon cancer) if someone in your family has had colon cancer or polyps before age 50.       Have a cholesterol test every 5 years.       Have a diabetes test (fasting glucose) after age 45. If you are at risk for diabetes, you should have this test every 3 years.    Shots: Get a flu shot each year. Get a tetanus shot every 10 years.     Nutrition:     Eat at least 5 servings of fruits and vegetables each day.    Eat whole-grain bread, whole-wheat pasta and brown rice instead of white grains and rice.    Get adequate Calcium and Vitamin D.      Lifestyle    Exercise at least 150 minutes a week (an average of 30 minutes a day, 5 days a week). This will help you control your weight and prevent disease.    Limit alcohol to one drink per day.    No smoking.     Wear sunscreen to prevent skin cancer.    See your dentist every six months for an exam and cleaning.    Will restart the estrogen tablets once daily as this should help with multiple symptoms.  If you have unwanted, persistent side effects, let me know.    Will touch base in 6 weeks.

## 2021-03-23 NOTE — PROGRESS NOTES
"   SUBJECTIVE:   CC: Jessica Tovar is an 40 year old woman who presents for preventive health visit.     Patient has been advised of split billing requirements and indicates understanding: Yes  Healthy Habits:     Getting at least 3 servings of Calcium per day:  Yes    Bi-annual eye exam:  NO    Dental care twice a year:  NO    Sleep apnea or symptoms of sleep apnea:  None    Diet:  Regular (no restrictions)    Frequency of exercise:  1 day/week    Duration of exercise:  15-30 minutes    Taking medications regularly:  Yes    Medication side effects:  Not applicable    PHQ-2 Total Score: 0    Additional concerns today:  No    She would like to discuss hormone therapy. She had a complete hysterectomy with oophorectomy in 2018. She was placed on oral estrogen after the procedure but ran out of this so has been of fit for a few years. She has been noticing increased irritability, occasional hot flashes (mostly in her neck), headaches, and increased fatigue over the past year. She does does not feel quite like herself. She also gained a lot of weight since the pandemic began and has started losing it again. She would like lab tests run along with restarting estrogen. She was on Lexapro a few years ago for \"anxiety\" but did not feel like this was helpful or necessary.     Today's PHQ-2 Score:   PHQ-2 ( 1999 Pfizer) 3/26/2021   Q1: Little interest or pleasure in doing things 0   Q2: Feeling down, depressed or hopeless 0   PHQ-2 Score 0   Q1: Little interest or pleasure in doing things Not at all   Q2: Feeling down, depressed or hopeless Not at all   PHQ-2 Score 0     Abuse: Current or Past (Physical, Sexual or Emotional) - No  Do you feel safe in your environment? Yes    Have you ever done Advance Care Planning? (For example, a Health Directive, POLST, or a discussion with a medical provider or your loved ones about your wishes): No, advance care planning information given to patient to review.  Patient declined advance " care planning discussion at this time.    Social History     Tobacco Use     Smoking status: Former Smoker     Packs/day: 0.25     Years: 6.00     Pack years: 1.50     Types: Cigarettes     Quit date: 8/15/2001     Years since quittin.6     Smokeless tobacco: Never Used     Tobacco comment: Stopped smoking when found out pg again.   Substance Use Topics     Alcohol use: Yes     Comment: Would drink 3 to 4  per month.       Alcohol Use 3/26/2021   Prescreen: >3 drinks/day or >7 drinks/week? No        Any new diagnosis of family breast, ovarian, or bowel cancer? No     Breast CA Risk Screening:  No FH of breast cancer. Okay to wait until 50.    History of abnormal Pap smear: Status post benign hysterectomy. Health Maintenance and Surgical History updated.     Reviewed and updated as needed this visit by clinical staff  Tobacco  Allergies  Meds  Problems  Med Hx  Surg Hx  Fam Hx  Soc Hx          Reviewed and updated as needed this visit by Provider  Tobacco  Allergies  Meds  Problems  Med Hx  Surg Hx  Fam Hx      Review of Systems   Constitutional: Negative for chills and fever.   HENT: Negative for congestion, ear pain, hearing loss and sore throat.    Eyes: Negative for pain and visual disturbance.   Respiratory: Negative for cough and shortness of breath.    Cardiovascular: Negative for chest pain, palpitations and peripheral edema.   Gastrointestinal: Negative for abdominal pain, constipation, diarrhea, heartburn, hematochezia and nausea.   Breasts:  Negative for tenderness, breast mass and discharge.   Genitourinary: Negative for dysuria, frequency, genital sores, hematuria, pelvic pain, urgency, vaginal bleeding and vaginal discharge.   Musculoskeletal: Positive for arthralgias. Negative for joint swelling and myalgias.   Skin: Negative for rash.   Neurological: Positive for headaches. Negative for dizziness, weakness and paresthesias.   Psychiatric/Behavioral: Negative for mood changes. The  "patient is not nervous/anxious.         OBJECTIVE:   /80   Pulse 90   Temp 98.7  F (37.1  C) (Temporal)   Ht 1.685 m (5' 6.34\")   Wt 85.7 kg (189 lb)   LMP 07/23/2018   SpO2 100%   BMI 30.19 kg/m    Physical Exam  GENERAL: healthy, alert and no distress  EYES: Eyes grossly normal to inspection, PERRL and conjunctivae and sclerae normal  HENT: ear canals and TM's normal, nose and mouth without ulcers or lesions  NECK: no adenopathy, no asymmetry, masses, or scars and thyroid normal to palpation  RESP: lungs clear to auscultation - no rales, rhonchi or wheezes  CV: regular rate and rhythm, normal S1 S2, no S3 or S4, no murmur, click or rub, no peripheral edema and peripheral pulses strong  ABDOMEN: soft, nontender, no hepatosplenomegaly, no masses and bowel sounds normal  MS: no gross musculoskeletal defects noted, no edema. FROM to all extremities. No spinal tenderness.   SKIN: no suspicious lesions or rashes  NEURO: Normal strength and tone, mentation intact and speech normal. Cranial nerves II-XII are grossly intact. DTRs are 2+/4 throughout and symmetric. Gait is stable.   PSYCH: mentation appears normal, affect normal/bright    ASSESSMENT/PLAN:       ICD-10-CM    1. Routine general medical examination at a health care facility  Z00.00    2. Fatigue, unspecified type  R53.83 CBC with platelets     Comprehensive metabolic panel (BMP + Alb, Alk Phos, ALT, AST, Total. Bili, TP)     TSH with free T4 reflex     Vitamin D Deficiency   3. Irritability  R45.4    4. Class 1 obesity due to excess calories without serious comorbidity with body mass index (BMI) of 30.0 to 30.9 in adult  E66.09     Z68.30    5. S/P laparoscopic assisted vaginal hysterectomy (LAVH)  Z90.710 estradiol (ESTRACE) 2 MG tablet   6. Elevated blood pressure reading without diagnosis of hypertension  R03.0    7. CARDIOVASCULAR SCREENING; LDL GOAL LESS THAN 160  Z13.6 Lipid panel reflex to direct LDL Fasting   8. Need for hepatitis C " "screening test  Z11.59 Hepatitis C Screen Reflex to HCV RNA Quant and Genotype       2-5. Will order labs tests to further evaluate her multiple symptoms but I think this is very likely related to early menopause due to her complete hysterectomy. Will restart estrogen 2 mg daily. Common side effects discussed. She is not a smoker and denies any history of blood clots. She denies anxiety or depression and I do not feel a mood medication is warranted. If she is deficiency in thyroid hormone or vitamin D, we can replace these as needed. I recommend she continue to work on more routine exercise along with a healthier, low carb diet. Will plan to touch base in 6 weeks via phone visit for a recheck.    6. Blood pressure today is normal. She was on lisinopril in the past but I do not think this needs to be restarted.    7-8. Updated non-fasting labs ordered.       Patient has been advised of split billing requirements and indicates understanding: Yes  COUNSELING:  Reviewed preventive health counseling, as reflected in patient instructions       Regular exercise       Healthy diet/nutrition    Estimated body mass index is 30.19 kg/m  as calculated from the following:    Height as of this encounter: 1.685 m (5' 6.34\").    Weight as of this encounter: 85.7 kg (189 lb).    Weight management plan: Discussed healthy diet and exercise guidelines    She reports that she quit smoking about 19 years ago. Her smoking use included cigarettes. She has a 1.50 pack-year smoking history. She has never used smokeless tobacco.      Counseling Resources:  ATP IV Guidelines  Pooled Cohorts Equation Calculator  Breast Cancer Risk Calculator  BRCA-Related Cancer Risk Assessment: FHS-7 Tool  FRAX Risk Assessment  ICSI Preventive Guidelines  Dietary Guidelines for Americans, 2010  USDA's MyPlate  ASA Prophylaxis  Lung CA Screening    DORIS Constantino [facility]  "

## 2021-03-26 ENCOUNTER — OFFICE VISIT (OUTPATIENT)
Dept: FAMILY MEDICINE | Facility: OTHER | Age: 41
End: 2021-03-26
Payer: COMMERCIAL

## 2021-03-26 VITALS
TEMPERATURE: 98.7 F | DIASTOLIC BLOOD PRESSURE: 80 MMHG | HEIGHT: 66 IN | WEIGHT: 189 LBS | SYSTOLIC BLOOD PRESSURE: 128 MMHG | OXYGEN SATURATION: 100 % | HEART RATE: 90 BPM | BODY MASS INDEX: 30.37 KG/M2

## 2021-03-26 DIAGNOSIS — R53.83 FATIGUE, UNSPECIFIED TYPE: ICD-10-CM

## 2021-03-26 DIAGNOSIS — Z00.00 ROUTINE GENERAL MEDICAL EXAMINATION AT A HEALTH CARE FACILITY: Primary | ICD-10-CM

## 2021-03-26 DIAGNOSIS — Z90.710 S/P LAPAROSCOPIC ASSISTED VAGINAL HYSTERECTOMY (LAVH): ICD-10-CM

## 2021-03-26 DIAGNOSIS — Z11.59 NEED FOR HEPATITIS C SCREENING TEST: ICD-10-CM

## 2021-03-26 DIAGNOSIS — R45.4 IRRITABILITY: ICD-10-CM

## 2021-03-26 DIAGNOSIS — R03.0 ELEVATED BLOOD PRESSURE READING WITHOUT DIAGNOSIS OF HYPERTENSION: ICD-10-CM

## 2021-03-26 DIAGNOSIS — E66.811 CLASS 1 OBESITY DUE TO EXCESS CALORIES WITHOUT SERIOUS COMORBIDITY WITH BODY MASS INDEX (BMI) OF 30.0 TO 30.9 IN ADULT: ICD-10-CM

## 2021-03-26 DIAGNOSIS — Z13.6 CARDIOVASCULAR SCREENING; LDL GOAL LESS THAN 160: ICD-10-CM

## 2021-03-26 DIAGNOSIS — E66.09 CLASS 1 OBESITY DUE TO EXCESS CALORIES WITHOUT SERIOUS COMORBIDITY WITH BODY MASS INDEX (BMI) OF 30.0 TO 30.9 IN ADULT: ICD-10-CM

## 2021-03-26 LAB
ALBUMIN SERPL-MCNC: 4.2 G/DL (ref 3.4–5)
ALP SERPL-CCNC: 93 U/L (ref 40–150)
ALT SERPL W P-5'-P-CCNC: 30 U/L (ref 0–50)
ANION GAP SERPL CALCULATED.3IONS-SCNC: 4 MMOL/L (ref 3–14)
AST SERPL W P-5'-P-CCNC: 14 U/L (ref 0–45)
BILIRUB SERPL-MCNC: 0.3 MG/DL (ref 0.2–1.3)
BUN SERPL-MCNC: 14 MG/DL (ref 7–30)
CALCIUM SERPL-MCNC: 8.9 MG/DL (ref 8.5–10.1)
CHLORIDE SERPL-SCNC: 110 MMOL/L (ref 94–109)
CHOLEST SERPL-MCNC: 179 MG/DL
CO2 SERPL-SCNC: 28 MMOL/L (ref 20–32)
CREAT SERPL-MCNC: 0.89 MG/DL (ref 0.52–1.04)
ERYTHROCYTE [DISTWIDTH] IN BLOOD BY AUTOMATED COUNT: 13.3 % (ref 10–15)
GFR SERPL CREATININE-BSD FRML MDRD: 81 ML/MIN/{1.73_M2}
GLUCOSE SERPL-MCNC: 96 MG/DL (ref 70–99)
HCT VFR BLD AUTO: 45.8 % (ref 35–47)
HDLC SERPL-MCNC: 56 MG/DL
HGB BLD-MCNC: 15 G/DL (ref 11.7–15.7)
LDLC SERPL CALC-MCNC: 105 MG/DL
MCH RBC QN AUTO: 29.1 PG (ref 26.5–33)
MCHC RBC AUTO-ENTMCNC: 32.8 G/DL (ref 31.5–36.5)
MCV RBC AUTO: 89 FL (ref 78–100)
NONHDLC SERPL-MCNC: 123 MG/DL
PLATELET # BLD AUTO: 214 10E9/L (ref 150–450)
POTASSIUM SERPL-SCNC: 4.4 MMOL/L (ref 3.4–5.3)
PROT SERPL-MCNC: 7.4 G/DL (ref 6.8–8.8)
RBC # BLD AUTO: 5.15 10E12/L (ref 3.8–5.2)
SODIUM SERPL-SCNC: 142 MMOL/L (ref 133–144)
TRIGL SERPL-MCNC: 89 MG/DL
TSH SERPL DL<=0.005 MIU/L-ACNC: 0.63 MU/L (ref 0.4–4)
WBC # BLD AUTO: 6.9 10E9/L (ref 4–11)

## 2021-03-26 PROCEDURE — 86803 HEPATITIS C AB TEST: CPT | Performed by: PHYSICIAN ASSISTANT

## 2021-03-26 PROCEDURE — 85027 COMPLETE CBC AUTOMATED: CPT | Performed by: PHYSICIAN ASSISTANT

## 2021-03-26 PROCEDURE — 80061 LIPID PANEL: CPT | Performed by: PHYSICIAN ASSISTANT

## 2021-03-26 PROCEDURE — 99213 OFFICE O/P EST LOW 20 MIN: CPT | Mod: 25 | Performed by: PHYSICIAN ASSISTANT

## 2021-03-26 PROCEDURE — 80053 COMPREHEN METABOLIC PANEL: CPT | Performed by: PHYSICIAN ASSISTANT

## 2021-03-26 PROCEDURE — 36415 COLL VENOUS BLD VENIPUNCTURE: CPT | Performed by: PHYSICIAN ASSISTANT

## 2021-03-26 PROCEDURE — 84443 ASSAY THYROID STIM HORMONE: CPT | Performed by: PHYSICIAN ASSISTANT

## 2021-03-26 PROCEDURE — 82306 VITAMIN D 25 HYDROXY: CPT | Performed by: PHYSICIAN ASSISTANT

## 2021-03-26 PROCEDURE — 99396 PREV VISIT EST AGE 40-64: CPT | Performed by: PHYSICIAN ASSISTANT

## 2021-03-26 RX ORDER — ESTRADIOL 2 MG/1
2 TABLET ORAL DAILY
Qty: 90 TABLET | Refills: 3 | Status: SHIPPED | OUTPATIENT
Start: 2021-03-26 | End: 2022-02-24

## 2021-03-26 ASSESSMENT — ENCOUNTER SYMPTOMS
PALPITATIONS: 0
HEMATOCHEZIA: 0
ARTHRALGIAS: 1
DYSURIA: 0
CONSTIPATION: 0
NERVOUS/ANXIOUS: 0
HEARTBURN: 0
FEVER: 0
CHILLS: 0
DIARRHEA: 0
ABDOMINAL PAIN: 0
COUGH: 0
MYALGIAS: 0
WEAKNESS: 0
SHORTNESS OF BREATH: 0
BREAST MASS: 0
HEMATURIA: 0
NAUSEA: 0
PARESTHESIAS: 0
SORE THROAT: 0
FREQUENCY: 0
JOINT SWELLING: 0
DIZZINESS: 0
HEADACHES: 1
EYE PAIN: 0

## 2021-03-26 ASSESSMENT — MIFFLIN-ST. JEOR: SCORE: 1549.43

## 2021-03-27 LAB
DEPRECATED CALCIDIOL+CALCIFEROL SERPL-MC: 37 UG/L (ref 20–75)
HCV AB SERPL QL IA: NONREACTIVE

## 2021-08-05 ENCOUNTER — E-VISIT (OUTPATIENT)
Dept: FAMILY MEDICINE | Facility: OTHER | Age: 41
End: 2021-08-05
Payer: COMMERCIAL

## 2021-08-05 DIAGNOSIS — E66.09 CLASS 1 OBESITY DUE TO EXCESS CALORIES WITHOUT SERIOUS COMORBIDITY WITH BODY MASS INDEX (BMI) OF 30.0 TO 30.9 IN ADULT: Primary | ICD-10-CM

## 2021-08-05 DIAGNOSIS — E66.811 CLASS 1 OBESITY DUE TO EXCESS CALORIES WITHOUT SERIOUS COMORBIDITY WITH BODY MASS INDEX (BMI) OF 30.0 TO 30.9 IN ADULT: Primary | ICD-10-CM

## 2021-08-05 PROCEDURE — 99422 OL DIG E/M SVC 11-20 MIN: CPT | Performed by: PHYSICIAN ASSISTANT

## 2021-08-05 RX ORDER — PHENTERMINE HYDROCHLORIDE 15 MG/1
15 CAPSULE ORAL EVERY MORNING
Qty: 30 CAPSULE | Refills: 0 | Status: SHIPPED | OUTPATIENT
Start: 2021-08-05 | End: 2021-09-09

## 2021-09-09 ENCOUNTER — MYC REFILL (OUTPATIENT)
Dept: FAMILY MEDICINE | Facility: OTHER | Age: 41
End: 2021-09-09

## 2021-09-09 DIAGNOSIS — E66.09 CLASS 1 OBESITY DUE TO EXCESS CALORIES WITHOUT SERIOUS COMORBIDITY WITH BODY MASS INDEX (BMI) OF 30.0 TO 30.9 IN ADULT: ICD-10-CM

## 2021-09-09 DIAGNOSIS — E66.811 CLASS 1 OBESITY DUE TO EXCESS CALORIES WITHOUT SERIOUS COMORBIDITY WITH BODY MASS INDEX (BMI) OF 30.0 TO 30.9 IN ADULT: ICD-10-CM

## 2021-09-13 NOTE — TELEPHONE ENCOUNTER
Pending Prescriptions:                       Disp   Refills    phentermine (ADIPEX-P) 15 MG capsule       30 cap*0        Sig: Take 1 capsule (15 mg) by mouth every morning      Routing refill request to provider for review/approval because:  Drug not on the [facility] refill protocol     Candice Stone RN on 9/13/2021 at 8:21 AM

## 2021-09-14 RX ORDER — PHENTERMINE HYDROCHLORIDE 15 MG/1
15 CAPSULE ORAL EVERY MORNING
Qty: 30 CAPSULE | Refills: 0 | Status: SHIPPED | OUTPATIENT
Start: 2021-09-14 | End: 2022-02-24

## 2022-02-03 ENCOUNTER — E-VISIT (OUTPATIENT)
Dept: FAMILY MEDICINE | Facility: OTHER | Age: 42
End: 2022-02-03
Payer: COMMERCIAL

## 2022-02-03 DIAGNOSIS — F43.23 ADJUSTMENT DISORDER WITH MIXED ANXIETY AND DEPRESSED MOOD: Primary | ICD-10-CM

## 2022-02-03 PROCEDURE — 99421 OL DIG E/M SVC 5-10 MIN: CPT | Performed by: PHYSICIAN ASSISTANT

## 2022-02-03 RX ORDER — HYDROXYZINE PAMOATE 25 MG/1
25 CAPSULE ORAL 3 TIMES DAILY PRN
Qty: 30 CAPSULE | Refills: 0 | Status: SHIPPED | OUTPATIENT
Start: 2022-02-03 | End: 2022-02-24

## 2022-02-03 ASSESSMENT — ANXIETY QUESTIONNAIRES
2. NOT BEING ABLE TO STOP OR CONTROL WORRYING: NEARLY EVERY DAY
7. FEELING AFRAID AS IF SOMETHING AWFUL MIGHT HAPPEN: NOT AT ALL
3. WORRYING TOO MUCH ABOUT DIFFERENT THINGS: NEARLY EVERY DAY
GAD7 TOTAL SCORE: 18
1. FEELING NERVOUS, ANXIOUS, OR ON EDGE: NEARLY EVERY DAY
5. BEING SO RESTLESS THAT IT IS HARD TO SIT STILL: NEARLY EVERY DAY
4. TROUBLE RELAXING: NEARLY EVERY DAY
GAD7 TOTAL SCORE: 18
8. IF YOU CHECKED OFF ANY PROBLEMS, HOW DIFFICULT HAVE THESE MADE IT FOR YOU TO DO YOUR WORK, TAKE CARE OF THINGS AT HOME, OR GET ALONG WITH OTHER PEOPLE?: EXTREMELY DIFFICULT
6. BECOMING EASILY ANNOYED OR IRRITABLE: NEARLY EVERY DAY
7. FEELING AFRAID AS IF SOMETHING AWFUL MIGHT HAPPEN: NOT AT ALL
GAD7 TOTAL SCORE: 18

## 2022-02-03 ASSESSMENT — PATIENT HEALTH QUESTIONNAIRE - PHQ9
SUM OF ALL RESPONSES TO PHQ QUESTIONS 1-9: 15
10. IF YOU CHECKED OFF ANY PROBLEMS, HOW DIFFICULT HAVE THESE PROBLEMS MADE IT FOR YOU TO DO YOUR WORK, TAKE CARE OF THINGS AT HOME, OR GET ALONG WITH OTHER PEOPLE: VERY DIFFICULT
SUM OF ALL RESPONSES TO PHQ QUESTIONS 1-9: 15

## 2022-02-04 ASSESSMENT — ANXIETY QUESTIONNAIRES: GAD7 TOTAL SCORE: 18

## 2022-02-04 ASSESSMENT — PATIENT HEALTH QUESTIONNAIRE - PHQ9: SUM OF ALL RESPONSES TO PHQ QUESTIONS 1-9: 15

## 2022-02-24 ENCOUNTER — OFFICE VISIT (OUTPATIENT)
Dept: FAMILY MEDICINE | Facility: OTHER | Age: 42
End: 2022-02-24
Payer: COMMERCIAL

## 2022-02-24 VITALS
DIASTOLIC BLOOD PRESSURE: 70 MMHG | HEART RATE: 77 BPM | BODY MASS INDEX: 27.64 KG/M2 | SYSTOLIC BLOOD PRESSURE: 118 MMHG | RESPIRATION RATE: 18 BRPM | HEIGHT: 66 IN | WEIGHT: 172 LBS | OXYGEN SATURATION: 96 % | TEMPERATURE: 99 F

## 2022-02-24 DIAGNOSIS — E66.3 OVERWEIGHT (BMI 25.0-29.9): ICD-10-CM

## 2022-02-24 DIAGNOSIS — F43.23 ADJUSTMENT DISORDER WITH MIXED ANXIETY AND DEPRESSED MOOD: ICD-10-CM

## 2022-02-24 DIAGNOSIS — Z90.710 S/P LAPAROSCOPIC ASSISTED VAGINAL HYSTERECTOMY (LAVH): ICD-10-CM

## 2022-02-24 DIAGNOSIS — Z00.00 ENCOUNTER FOR ROUTINE ADULT HEALTH EXAMINATION WITHOUT ABNORMAL FINDINGS: Primary | ICD-10-CM

## 2022-02-24 PROCEDURE — 99214 OFFICE O/P EST MOD 30 MIN: CPT | Mod: 25 | Performed by: PHYSICIAN ASSISTANT

## 2022-02-24 PROCEDURE — 99396 PREV VISIT EST AGE 40-64: CPT | Performed by: PHYSICIAN ASSISTANT

## 2022-02-24 RX ORDER — SERTRALINE HYDROCHLORIDE 100 MG/1
100 TABLET, FILM COATED ORAL DAILY
Qty: 90 TABLET | Refills: 1 | Status: SHIPPED | OUTPATIENT
Start: 2022-02-24 | End: 2022-07-14

## 2022-02-24 RX ORDER — HYDROXYZINE PAMOATE 25 MG/1
25 CAPSULE ORAL 3 TIMES DAILY PRN
Qty: 60 CAPSULE | Refills: 2 | Status: SHIPPED | OUTPATIENT
Start: 2022-02-24 | End: 2022-06-27

## 2022-02-24 RX ORDER — ESTRADIOL 2 MG/1
2 TABLET ORAL DAILY
Qty: 90 TABLET | Refills: 3 | Status: SHIPPED | OUTPATIENT
Start: 2022-02-24 | End: 2024-08-22

## 2022-02-24 RX ORDER — PHENTERMINE HYDROCHLORIDE 15 MG/1
15 CAPSULE ORAL EVERY MORNING
Qty: 30 CAPSULE | Refills: 2 | Status: SHIPPED | OUTPATIENT
Start: 2022-02-24 | End: 2022-09-07

## 2022-02-24 ASSESSMENT — ENCOUNTER SYMPTOMS
DIZZINESS: 0
FREQUENCY: 0
PALPITATIONS: 0
JOINT SWELLING: 0
DIARRHEA: 0
SHORTNESS OF BREATH: 0
BREAST MASS: 0
CHILLS: 0
CONSTIPATION: 0
PARESTHESIAS: 0
NERVOUS/ANXIOUS: 1
EYE PAIN: 0
FEVER: 0
HEMATURIA: 0
HEMATOCHEZIA: 0
HEARTBURN: 0
ABDOMINAL PAIN: 0
ARTHRALGIAS: 0
NAUSEA: 0
COUGH: 0
HEADACHES: 1
WEAKNESS: 0
MYALGIAS: 0
DYSURIA: 0
SORE THROAT: 0

## 2022-02-24 ASSESSMENT — PAIN SCALES - GENERAL: PAINLEVEL: NO PAIN (0)

## 2022-02-24 NOTE — PROGRESS NOTES
"   SUBJECTIVE:   CC: Jessica Tovar is an 41 year old woman who presents for preventive health visit.       Patient has been advised of split billing requirements and indicates understanding: Yes     Healthy Habits:     Getting at least 3 servings of Calcium per day:  Yes    Bi-annual eye exam:  NO    Dental care twice a year:  Yes    Sleep apnea or symptoms of sleep apnea:  None    Diet:  Regular (no restrictions)    Frequency of exercise:  None    Taking medications regularly:  Yes    Medication side effects:  Not applicable    PHQ-2 Total Score: 2    Additional concerns today:  No    She continues to describe a lot of anxiety and depression over the past few months. She was started on sertraline 3 weeks ago and states it is \"taking the edge off\" but she still has trouble finding motivation to get out of bed in the morning and leave her house. She also has difficulty concentrating and her mind is always racing. She takes hydroxyzine during the day as needed with some benefit of her anxiety. She has tried taking it at night for sleep without benefit. She denies any thoughts of self harm and is not interested in counseling at this time. She wants to lose more weight and is interested in trying phentermine again. She has been of the Estradiol recently but is not sure why.       Today's PHQ-2 Score:   PHQ-2 ( 1999 Pfizer) 2/24/2022   Q1: Little interest or pleasure in doing things 1   Q2: Feeling down, depressed or hopeless 1   PHQ-2 Score 2   PHQ-2 Total Score (12-17 Years)- Positive if 3 or more points; Administer PHQ-A if positive -   Q1: Little interest or pleasure in doing things Several days   Q2: Feeling down, depressed or hopeless Several days   PHQ-2 Score 2       Abuse: Current or Past (Physical, Sexual or Emotional) - No  Do you feel safe in your environment? Yes        Social History     Tobacco Use     Smoking status: Former Smoker     Packs/day: 0.25     Years: 6.00     Pack years: 1.50     Types: " Cigarettes     Quit date: 8/15/2001     Years since quittin.5     Smokeless tobacco: Never Used     Tobacco comment: Stopped smoking when found out pg again.   Substance Use Topics     Alcohol use: Yes     Comment: Would drink 3 to 4  per month.         Alcohol Use 2022   Prescreen: >3 drinks/day or >7 drinks/week? No       Reviewed orders with patient.  Reviewed health maintenance and updated orders accordingly - Yes      Breast Cancer Screening:  Any new diagnosis of family breast, ovarian, or bowel cancer? No    Breast CA Risk Assessment (FHS-7) 3/26/2021   Do you have a family history of breast, colon, or ovarian cancer? No / Unknown       Pertinent mammograms are reviewed under the imaging tab.    History of abnormal Pap smear: Status post benign hysterectomy. Health Maintenance and Surgical History updated.     Reviewed and updated as needed this visit by clinical staff   Tobacco  Allergies  Meds  Problems  Med Hx  Surg Hx  Fam Hx  Soc   Hx        Reviewed and updated as needed this visit by Provider   Tobacco  Allergies  Meds  Problems  Med Hx  Surg Hx  Fam Hx           Review of Systems  CONSTITUTIONAL: NEGATIVE for fever, chills, change in weight  INTEGUMENTARY/SKIN: NEGATIVE for worrisome rashes, moles or lesions  EYES: NEGATIVE for vision changes or irritation  ENT: NEGATIVE for ear, mouth and throat problems  RESP: NEGATIVE for significant cough or SOB  BREAST: NEGATIVE for masses, tenderness or discharge  CV: NEGATIVE for chest pain, palpitations or peripheral edema  GI: NEGATIVE for nausea, abdominal pain, heartburn, or change in bowel habits  : NEGATIVE for unusual urinary or vaginal symptoms. No vaginal bleeding.  MUSCULOSKELETAL: NEGATIVE for significant arthralgias or myalgia  NEURO: NEGATIVE for weakness, dizziness or paresthesias  ENDOCRINE: NEGATIVE for temperature intolerance, skin/hair changes  HEME/ALLERGY/IMMUNE: NEGATIVE for bleeding problems  PSYCHIATRIC: NEGATIVE  "for changes in mood or affect      OBJECTIVE:   /70   Pulse 77   Temp 99  F (37.2  C) (Temporal)   Resp 18   Ht 1.68 m (5' 6.14\")   Wt 78 kg (172 lb)   LMP 07/23/2018   SpO2 96%   BMI 27.64 kg/m    Physical Exam  GENERAL APPEARANCE: healthy, alert and no distress  EYES: Eyes grossly normal to inspection, PERRL and conjunctivae and sclerae normal  HENT: ear canals and TM's normal, nose and mouth without ulcers or lesions, oropharynx clear and oral mucous membranes moist  NECK: no adenopathy, no asymmetry, masses, or scars and thyroid normal to palpation  RESP: lungs clear to auscultation - no rales, rhonchi or wheezes  CV: regular rate and rhythm, normal S1 S2, no S3 or S4, no murmur, click or rub, no peripheral edema and peripheral pulses strong  ABDOMEN: soft, nontender, no hepatosplenomegaly, no masses and bowel sounds normal  MS: no musculoskeletal defects are noted and gait is age appropriate without ataxia  SKIN: no suspicious lesions or rashes  NEURO: Normal strength and tone, sensory exam grossly normal, mentation intact and speech normal. Gait is stable.   PSYCH: mentation appears normal and affect is anxiouus    ASSESSMENT/PLAN:       ICD-10-CM    1. Encounter for routine adult health examination without abnormal findings  Z00.00    2. Adjustment disorder with mixed anxiety and depressed mood  F43.23 sertraline (ZOLOFT) 100 MG tablet     hydrOXYzine (VISTARIL) 25 MG capsule   3. Overweight (BMI 25.0-29.9)  E66.3 phentermine (ADIPEX-P) 15 MG capsule   4. S/P laparoscopic assisted vaginal hysterectomy (LAVH)  Z90.710 estradiol (ESTRACE) 2 MG tablet       2. Anxiety and depression are slightly improved on the sertraline but still not where she would like her mood. It has only been 3 weeks on the sertraline so will give more time but I also think a slightly increase to 100 mg would be beneficial. She will continue with as needed hydroxyzine for anxiety and can try 2-3 capsules for insomnia. She is " "not interested in counseling at this time but I discussed the importance of her setting aside at least 30 minutes daily to pursue activities she enjoys. Will plan on recheck in 1 month.    3. She is interested in trying phentermine again so will restart at 15 mg daily and can increase if needed. Will recheck in 1 month and do not recommend longer than 3-4 months at a time.    4. Will restart estradiol given her complete hysterectomy with bilateral salpingoopherectomy in 2018. We did not discuss this today but will discuss early breast cancer screening at her next visit given her oral estrogen use.        Patient has been advised of split billing requirements and indicates understanding: Yes    COUNSELING:  Reviewed preventive health counseling, as reflected in patient instructions       Regular exercise       Healthy diet/nutrition    Estimated body mass index is 27.64 kg/m  as calculated from the following:    Height as of this encounter: 1.68 m (5' 6.14\").    Weight as of this encounter: 78 kg (172 lb).    Weight management plan: Discussed healthy diet and exercise guidelines    She reports that she quit smoking about 20 years ago. Her smoking use included cigarettes. She has a 1.50 pack-year smoking history. She has never used smokeless tobacco.      Counseling Resources:  ATP IV Guidelines  Pooled Cohorts Equation Calculator  Breast Cancer Risk Calculator  BRCA-Related Cancer Risk Assessment: FHS-7 Tool  FRAX Risk Assessment  ICSI Preventive Guidelines  Dietary Guidelines for Americans, 2010  USDA's MyPlate  ASA Prophylaxis  Lung CA Screening    Don Prieto PA-C*  [facility]  "

## 2022-02-24 NOTE — PATIENT INSTRUCTIONS
Preventive Health Recommendations  Female Ages 40 to 49    Yearly exam:     See your health care provider every year in order to  1. Review health changes.   2. Discuss preventive care.    3. Review your medicines if your doctor prescribed any.      Get a Pap test every three years (unless you have an abnormal result and your provider advises testing more often).      If you get Pap tests with HPV test, you only need to test every 5 years, unless you have an abnormal result. You do not need a Pap test if your uterus was removed (hysterectomy) and you have not had cancer.      You should be tested each year for STDs (sexually transmitted diseases), if you're at risk.     Ask your doctor if you should have a mammogram.      Have a colonoscopy (test for colon cancer) if someone in your family has had colon cancer or polyps before age 50.       Have a cholesterol test every 5 years.       Have a diabetes test (fasting glucose) after age 45. If you are at risk for diabetes, you should have this test every 3 years.    Shots: Get a flu shot each year. Get a tetanus shot every 10 years.     Nutrition:     Eat at least 5 servings of fruits and vegetables each day.    Eat whole-grain bread, whole-wheat pasta and brown rice instead of white grains and rice.    Get adequate Calcium and Vitamin D.      Lifestyle    Exercise at least 150 minutes a week (an average of 30 minutes a day, 5 days a week). This will help you control your weight and prevent disease.    Limit alcohol to one drink per day.    No smoking.     Wear sunscreen to prevent skin cancer.    See your dentist every six months for an exam and cleaning.    Will increase sertraline to 100 mg daily.    Will restart phentermine and oral estrogen.    Try to find something you enjoy each day.    Follow up in 1 month.

## 2022-06-13 DIAGNOSIS — E66.3 OVERWEIGHT (BMI 25.0-29.9): ICD-10-CM

## 2022-06-13 RX ORDER — PHENTERMINE HYDROCHLORIDE 15 MG/1
15 CAPSULE ORAL EVERY MORNING
Qty: 30 CAPSULE | Refills: 2 | Status: CANCELLED | OUTPATIENT
Start: 2022-06-13

## 2022-06-14 NOTE — TELEPHONE ENCOUNTER
Pending Prescriptions:                       Disp   Refills    phentermine (ADIPEX-P) 15 MG capsule       30 cap*2        Sig: Take 1 capsule (15 mg) by mouth every morning    Routing refill request to provider for review/approval because:  Drug not on the [facility] refill protocol

## 2022-06-14 NOTE — TELEPHONE ENCOUNTER
Due for follow-up prior to this being refilled. Was supposed to be seen in March.    Don Prieto PA-C

## 2022-07-14 ENCOUNTER — E-VISIT (OUTPATIENT)
Dept: FAMILY MEDICINE | Facility: OTHER | Age: 42
End: 2022-07-14
Payer: COMMERCIAL

## 2022-07-14 DIAGNOSIS — R61 HYPERHIDROSIS: ICD-10-CM

## 2022-07-14 DIAGNOSIS — R23.3 EASY BRUISING: ICD-10-CM

## 2022-07-14 DIAGNOSIS — E66.3 OVERWEIGHT (BMI 25.0-29.9): ICD-10-CM

## 2022-07-14 DIAGNOSIS — F43.23 ADJUSTMENT DISORDER WITH MIXED ANXIETY AND DEPRESSED MOOD: Primary | ICD-10-CM

## 2022-07-14 PROCEDURE — 99422 OL DIG E/M SVC 11-20 MIN: CPT | Performed by: PHYSICIAN ASSISTANT

## 2022-07-14 RX ORDER — SERTRALINE HYDROCHLORIDE 100 MG/1
150 TABLET, FILM COATED ORAL DAILY
Qty: 135 TABLET | Refills: 1 | Status: SHIPPED | OUTPATIENT
Start: 2022-07-14 | End: 2024-08-22

## 2022-07-14 RX ORDER — PHENTERMINE HYDROCHLORIDE 30 MG/1
30 CAPSULE ORAL EVERY MORNING
Qty: 30 CAPSULE | Refills: 0 | Status: SHIPPED | OUTPATIENT
Start: 2022-07-14 | End: 2022-09-07

## 2022-08-23 ENCOUNTER — NURSE TRIAGE (OUTPATIENT)
Dept: FAMILY MEDICINE | Facility: CLINIC | Age: 42
End: 2022-08-23

## 2022-08-23 NOTE — TELEPHONE ENCOUNTER
Patient has been having swelling in her knee for multiple weeks.  When RN offered her a couple of appointments in the next couple of days, she states she is just going to go to  today.      Reason for Disposition    Patient wants to be seen    Additional Information    Negative: Followed a knee injury    Negative: Followed a bee sting and has localized swelling (e.g., small area of puffy or swollen skin)    Negative: Followed an insect bite and has localized swelling (e.g., small area of puffy or swollen skin)    Negative: Knee pain is main symptom    Negative: Swelling of calf or leg is main symptom    Negative: SEVERE swelling (e.g., can't move swollen knee at all)    Negative: Looks like a boil, infected sore, deep ulcer or other infected rash (spreading redness, pus)    Negative: Redness of the skin and no fever    Negative: SEVERE pain (e.g., excruciating, unable to walk) and not improved after 2 hours of pain medicine    Negative: Redness and painful when touched and no fever    Negative: Red area or streak > 2 inches (or 5 cm)    Negative: Thigh or calf pain and only 1 side and present > 1 hour    Negative: Thigh or calf swelling and only 1 side    Negative: Knee pain and fever    Negative: Knee redness and fever    Negative: Patient sounds very sick or weak to the triager    Protocols used: KNEE SWELLING-A-OH

## 2022-09-02 ENCOUNTER — TELEPHONE (OUTPATIENT)
Dept: FAMILY MEDICINE | Facility: CLINIC | Age: 42
End: 2022-09-02

## 2022-09-02 ENCOUNTER — HOSPITAL ENCOUNTER (EMERGENCY)
Facility: CLINIC | Age: 42
Discharge: HOME | End: 2022-09-02
Attending: EMERGENCY MEDICINE | Admitting: EMERGENCY MEDICINE
Payer: COMMERCIAL

## 2022-09-02 VITALS
RESPIRATION RATE: 16 BRPM | WEIGHT: 187.39 LBS | OXYGEN SATURATION: 98 % | HEIGHT: 66 IN | DIASTOLIC BLOOD PRESSURE: 82 MMHG | SYSTOLIC BLOOD PRESSURE: 139 MMHG | HEART RATE: 94 BPM | TEMPERATURE: 98.2 F | BODY MASS INDEX: 30.12 KG/M2

## 2022-09-02 DIAGNOSIS — M23.91 INTERNAL DERANGEMENT OF KNEE, RIGHT: ICD-10-CM

## 2022-09-02 PROCEDURE — 99284 EMERGENCY DEPT VISIT MOD MDM: CPT | Performed by: EMERGENCY MEDICINE

## 2022-09-02 PROCEDURE — 99282 EMERGENCY DEPT VISIT SF MDM: CPT | Performed by: EMERGENCY MEDICINE

## 2022-09-02 NOTE — ED PROVIDER NOTES
History     Chief Complaint   Patient presents with     Knee Injury     HPI  Jessica Tovar is a 42 year old female who presents with right knee pain.  Problematic for over 30 days.  Has had significant joint effusions.  Was seen at the [facility] urgent care  where x-rays per patient were normal with no alignment concerns or osseous concerns.  They did note there was a moderate amount of fluid in the joint.  She has been doing limited weightbearing.  Icing and using over-the-counter NSAIDs.  Discomfort started after she was helping move a relative to a new residence.  She has had no prior surgery.  Pain is moderately severe and worse when bearing weight.  Has not been using crutches.    Allergies:  Allergies   Allergen Reactions     Hydrocodone-Acetaminophen Itching     Morphine Itching     Ofloxacin      floxin       Problem List:    Patient Active Problem List    Diagnosis Date Noted     Dysmenorrhea 2018     Priority: Medium     Endometriosis of pelvic peritoneum 2018     Priority: Medium     Menorrhagia 2018     Priority: Medium     Elevated blood pressure reading without diagnosis of hypertension 2018     Priority: Medium     Previous  delivery, antepartum condition or complication 2002     Priority: Medium     Intractable migraine with aura      Priority: Medium     Problem list name updated by automated process. Provider to review          Past Medical History:    Past Medical History:   Diagnosis Date     Migraine without aura, with intractable migraine, so stated, without mention of status migrainosus      Varicella without mention of complication        Past Surgical History:    Past Surgical History:   Procedure Laterality Date     HC DILATION/CURETTAGE DIAG/THER NON OB  2001     HC REMOVE TONSILS/ADENOIDS,<13 Y/O       HYSTERECTOMY, PAP NO LONGER INDICATED       LAPAROSCOPIC ASSISTED HYSTERECTOMY VAGINAL N/A 2018    Procedure: LAPAROSCOPIC  "ASSISTED HYSTERECTOMY VAGINAL;  Laparoscopic Assisted Vaginal Hysterectomy with Bilateral salpingo-opherectomy, cystoscopy;  Surgeon: Molly Vo MD;  Location: [place] OR     [facility]  DELIVERY ONLY  10/27/1999     [facility]  DELIVERY ONLY  2002    BTL at the time of        Family History:    Family History   Problem Relation Age of Onset     Diabetes Mother      Depression Mother      Alcohol/Drug Father         ETOH     Diabetes Father      Depression Sister         older     Arthritis Maternal Grandmother      Heart Disease Paternal Grandfather      Diabetes Brother         younger     Other - See Comments Son         10/27/1999     Other - See Comments Son         2002       Social History:  Marital Status:  Single [1]  Social History     Tobacco Use     Smoking status: Former Smoker     Packs/day: 0.25     Years: 6.00     Pack years: 1.50     Types: Cigarettes     Quit date: 8/15/2001     Years since quittin.0     Smokeless tobacco: Never Used     Tobacco comment: Stopped smoking when found out pg again.   Substance Use Topics     Alcohol use: Yes     Comment: Would drink 3 to 4  per month.     Drug use: No        Medications:    ALEVE 220 MG OR TABS  estradiol (ESTRACE) 2 MG tablet  hydrOXYzine (VISTARIL) 25 MG capsule  phentermine (ADIPEX-P) 15 MG capsule  phentermine (ADIPEX-P) 30 MG capsule  sertraline (ZOLOFT) 100 MG tablet          Review of Systems   All other systems reviewed and are negative.      Physical Exam   BP: 139/82  Pulse: 94  Temp: 98.2  F (36.8  C)  Resp: 16  Height: 167.6 cm (5' 6\")  Weight: 85 kg (187 lb 6.3 oz)  SpO2: 98 %      Physical Exam  Vitals and nursing note reviewed.   Constitutional:       Appearance: She is not ill-appearing.   HENT:      Head: Normocephalic.      Nose: Nose normal.   Eyes:      Conjunctiva/sclera: Conjunctivae normal.   Cardiovascular:      Rate and Rhythm: Normal rate.   Pulmonary:      Effort: Pulmonary effort is " normal.   Musculoskeletal:      Comments: Right knee:  Minimal joint effusion  Fullness popliteal fossa consistent with Baker's cyst  Accentuated Q angle  Provocative tests for ligament all negative  Provocative test for meniscus triggered pain in both the medial lateral compartment but more in the medial compartment  Able to fully extend  Capable of flexing 210 degrees  CMS intact  No signs for joint synovitis   Skin:     General: Skin is warm.      Capillary Refill: Capillary refill takes less than 2 seconds.      Findings: No rash.   Neurological:      General: No focal deficit present.      Mental Status: She is alert and oriented to person, place, and time.   Psychiatric:         Mood and Affect: Mood normal.         Behavior: Behavior normal.         ED Course                 Procedures                      Assessments & Plan (with Medical Decision Making)  Persistent pain and swelling right knee.  Dating back more than 30 days.  Limiting weightbearing.  Previous plain x-rays completed the [facility] showed no acute joint or osseous injury.  Today the patient has provocative test concerning for internal derangement with meniscal injury.  No locking.  I recommend MRI and follow-up primary care provider and if necessary orthopedic surgery.     I have reviewed the nursing notes.    I have reviewed the findings, diagnosis, plan and need for follow up with the patient.      New Prescriptions    No medications on file       Final diagnoses:   Internal derangement of knee, right       9/2/2022   [facility] EMERGENCY DEPT     Sanjiv Muhammad,   09/02/22 0832

## 2022-09-02 NOTE — TELEPHONE ENCOUNTER
Pt calling in to get some advise , she went to ER for her knee that she twisted a few weeks ago. She said they did nothing besides tell her to get an MRI. they thought maybe it is her meniscus. Wants to know if you Recommend a brace or something to help in the mean time as She has to work all weekend.       I did trans her to imaging scheduling to get MRI scheduled.

## 2022-09-02 NOTE — ED TRIAGE NOTES
Here with right knee pain and swelling. She was seen in urgent care last week and was told she has fluid on the knee. States she discomfort has gotten worse     Triage Assessment     Row Name 09/02/22 0753       Triage Assessment (Adult)    Airway WDL WDL    Additional Documentation Breath Sounds (Group)       Skin Circulation/Temperature WDL    Skin Circulation/Temperature WDL WDL       Cardiac WDL    Cardiac WDL WDL       Peripheral/Neurovascular WDL    Peripheral Neurovascular WDL WDL       Cognitive/Neuro/Behavioral WDL    Cognitive/Neuro/Behavioral WDL WDL

## 2022-09-02 NOTE — TELEPHONE ENCOUNTER
I would recommend she buy an over the counter knee brace/knee sleeve for discomfort and use ibuprofen/Tylenol, ice and heat.    Don Prieto PA-C

## 2022-09-02 NOTE — DISCHARGE INSTRUCTIONS
-Continue icing for 30 minutes 3-4 times daily  -Ibuprofen or Aleve can be used for swelling and discomfort  -Weightbearing as tolerated  -MRI order has been requested electronically.  The department will contact you by phone to arrange for the appointment.  -Once the MRI has been completed results are usually available in 24 hours.  Contacting your primary clinic provider.

## 2022-09-06 ENCOUNTER — TELEPHONE (OUTPATIENT)
Dept: FAMILY MEDICINE | Facility: CLINIC | Age: 42
End: 2022-09-06

## 2022-09-06 ENCOUNTER — HOSPITAL ENCOUNTER (OUTPATIENT)
Dept: MRI IMAGING | Facility: CLINIC | Age: 42
Discharge: HOME | End: 2022-09-06
Attending: EMERGENCY MEDICINE | Admitting: EMERGENCY MEDICINE
Payer: COMMERCIAL

## 2022-09-06 DIAGNOSIS — M23.91 INTERNAL DERANGEMENT OF KNEE, RIGHT: ICD-10-CM

## 2022-09-06 PROCEDURE — 73721 MRI JNT OF LWR EXTRE W/O DYE: CPT | Mod: 26 | Performed by: RADIOLOGY

## 2022-09-06 PROCEDURE — 73721 MRI JNT OF LWR EXTRE W/O DYE: CPT | Mod: RT

## 2022-09-06 NOTE — TELEPHONE ENCOUNTER
Patient is really wondering what she should do going forward.      Per ED visit on 9/2/22 she is limited weightbearing and she is a  and needs to know what she can and cannot do.    If you could touch base with her regarding her MRI and a plan of action going forward.    Liset Keenan RN  [facility] ~ Registered Nurse  Clinic Triage ~ [facility]  September 6, 2022

## 2022-09-07 ENCOUNTER — VIRTUAL VISIT (OUTPATIENT)
Dept: FAMILY MEDICINE | Facility: OTHER | Age: 42
End: 2022-09-07
Payer: COMMERCIAL

## 2022-09-07 ENCOUNTER — PREP FOR PROCEDURE (OUTPATIENT)
Dept: ORTHOPEDICS | Facility: CLINIC | Age: 42
End: 2022-09-07

## 2022-09-07 ENCOUNTER — OFFICE VISIT (OUTPATIENT)
Dept: ORTHOPEDICS | Facility: CLINIC | Age: 42
End: 2022-09-07
Attending: PHYSICIAN ASSISTANT

## 2022-09-07 VITALS — DIASTOLIC BLOOD PRESSURE: 73 MMHG | HEART RATE: 78 BPM | SYSTOLIC BLOOD PRESSURE: 124 MMHG | OXYGEN SATURATION: 95 %

## 2022-09-07 DIAGNOSIS — S83.281A TEAR OF LATERAL MENISCUS OF RIGHT KNEE, CURRENT, UNSPECIFIED TEAR TYPE, INITIAL ENCOUNTER: Primary | ICD-10-CM

## 2022-09-07 DIAGNOSIS — S83.241A OTHER TEAR OF MEDIAL MENISCUS OF RIGHT KNEE AS CURRENT INJURY, INITIAL ENCOUNTER: Primary | ICD-10-CM

## 2022-09-07 DIAGNOSIS — S83.281A TEAR OF LATERAL MENISCUS OF RIGHT KNEE, CURRENT, UNSPECIFIED TEAR TYPE, INITIAL ENCOUNTER: ICD-10-CM

## 2022-09-07 DIAGNOSIS — S83.271A COMPLEX TEAR OF LATERAL MENISCUS OF RIGHT KNEE, UNSPECIFIED WHETHER OLD OR CURRENT TEAR, INITIAL ENCOUNTER: Primary | ICD-10-CM

## 2022-09-07 DIAGNOSIS — F43.23 ADJUSTMENT DISORDER WITH MIXED ANXIETY AND DEPRESSED MOOD: ICD-10-CM

## 2022-09-07 PROCEDURE — 99213 OFFICE O/P EST LOW 20 MIN: CPT | Performed by: PHYSICIAN ASSISTANT

## 2022-09-07 PROCEDURE — 99244 OFF/OP CNSLTJ NEW/EST MOD 40: CPT | Performed by: ORTHOPAEDIC SURGERY

## 2022-09-07 ASSESSMENT — PATIENT HEALTH QUESTIONNAIRE - PHQ9
SUM OF ALL RESPONSES TO PHQ QUESTIONS 1-9: 7
SUM OF ALL RESPONSES TO PHQ QUESTIONS 1-9: 7
10. IF YOU CHECKED OFF ANY PROBLEMS, HOW DIFFICULT HAVE THESE PROBLEMS MADE IT FOR YOU TO DO YOUR WORK, TAKE CARE OF THINGS AT HOME, OR GET ALONG WITH OTHER PEOPLE: NOT DIFFICULT AT ALL

## 2022-09-07 ASSESSMENT — PAIN SCALES - GENERAL: PAINLEVEL: SEVERE PAIN (6)

## 2022-09-07 NOTE — PROGRESS NOTES
SUBJECTIVE:   Jessica Tovar is a 42 year old female who is seen in consultation at the request of Don Prieto PA-C for evaluation of right knee pain.    HPI: Worsening right knee pain over the past 6 weeks after helping a friend move.  Reports banging the knee occasionally, tripping occasionally since the knee started to hurt.  Present symptoms: constant pain  Some positions feel better  Worse with walking and if pivots.   Getting up from sitting is painful.  Locks, +feeling of giving-way,  No swelling lately   Stairs more painful  Drags leg sometimes  Pain diffuse    Treatments tried to this point: NSAIDs, ice, wrapping.    Previous knee issues: no previous knee problems     Past Medical History:   Past Medical History:   Diagnosis Date     Migraine without aura, with intractable migraine, so stated, without mention of status migrainosus     Hx of migraines. Takes Midrin prn migraine per Dr. Eddy.     Varicella without mention of complication     Had chicken pox as a child ages 3 and 8     Past Surgical History:   Past Surgical History:   Procedure Laterality Date     HC DILATION/CURETTAGE DIAG/THER NON OB  2001     HC REMOVE TONSILS/ADENOIDS,<13 Y/O       HYSTERECTOMY, PAP NO LONGER INDICATED       LAPAROSCOPIC ASSISTED HYSTERECTOMY VAGINAL N/A 2018    Procedure: LAPAROSCOPIC ASSISTED HYSTERECTOMY VAGINAL;  Laparoscopic Assisted Vaginal Hysterectomy with Bilateral salpingo-opherectomy, cystoscopy;  Surgeon: Molly Vo MD;  Location: [facility] OR     [facility]  DELIVERY ONLY  10/27/1999     [facility]  DELIVERY ONLY  2002    BTL at the time of      Family History:   Family History   Problem Relation Age of Onset     Diabetes Mother      Depression Mother      Alcohol/Drug Father         ETOH     Diabetes Father      Depression Sister         older     Arthritis Maternal Grandmother      Heart Disease Paternal Grandfather      Diabetes Brother         younger      Other - See Comments Son         10/27/1999     Other - See Comments Son         2002     Social History:   Social History     Tobacco Use     Smoking status: Former Smoker     Packs/day: 0.25     Years: 6.00     Pack years: 1.50     Types: Cigarettes     Quit date: 8/15/2001     Years since quittin.0     Smokeless tobacco: Never Used     Tobacco comment: Stopped smoking when found out pg again.   Substance Use Topics     Alcohol use: Yes     Comment: Would drink 3 to 4  per month.     Works as , school kitchen.    Review of Systems:  Constitutional:  NEGATIVE for fever, chills, change in weight  Integumentary/Skin:  NEGATIVE for worrisome rashes, moles or lesions  Eyes:  NEGATIVE for vision changes or irritation  ENT/Mouth:  NEGATIVE for ear, mouth and throat problems  Resp:  NEGATIVE for significant cough or SOB  Breast:  NEGATIVE for masses, tenderness or discharge  CV:  NEGATIVE for chest pain, palpitations or peripheral edema  GI:  NEGATIVE for nausea, abdominal pain, heartburn, or change in bowel habits  :  Negative   Musculoskeletal:  See HPI above  Neuro:  NEGATIVE for weakness, dizziness or paresthesias  Endocrine:  NEGATIVE for temperature intolerance, skin/hair changes  Heme/allergy/immune:  NEGATIVE for bleeding problems  Psychiatric:  NEGATIVE for changes in mood or affect      OBJECTIVE:  Physical Exam:  /73 (BP Location: Left arm, Patient Position: Sitting, Cuff Size: Adult Regular)   Pulse 78   LMP 2018   SpO2 95%   General Appearance: healthy, alert and no distress   Skin: no suspicious lesions or rashes  Neuro: Normal strength and tone, mentation intact and speech normal  Vascular: good pulses, and cappillary refill   Lymph: no lymphadenopathy   Psych:  mentation appears normal and affect normal/bright  Resp: no increased work of breathing     Right Knee Exam:  Gait: walks with antalgic gait favoring right side  Alignment: normal   Squat: 50 % painful.     Patellofemoral joint: no crepitations in the patellofemoral joint.  Effusion: moderate  ROM: 7*-130*  Tender: lateral joint line  Masses: none  Ligaments:   Lachman's: stable   Anterior/Posterior drawer: stable,   Varus/Valgus stress: stable to varus and valgus stress  McMurrays: positive medially, positive laterally      MRI:    From 9/6/22, right knee, reviewed in the office with the patient today:     1. Multidirectional tear of body, posterior horn and likely anterior  horn of the lateral meniscus.  2. Query posterolateral corner injury:  a. Posterolateral capsular sprain.  b. Low grade sprain of fibular collateral ligament.  c. Likely moderate grade sprain popliteofibular ligament.  3. Mild-to-moderate grade sprain of the medial meniscofemoral and  meniscotibial ligaments.  4. Subchondral/subcortical edema at the anterior lateral tibial  plateau, likely bone contusion.  5. Edema deep and superficial to the iliotibial band, query iliotibial  band friction syndrome.      She had an xray of the knee which we don't have results for.    ASSESSMENT:   Lateral meniscus tear right knee-- anterior, diffuse  Possible PLC injury but not laxity on exam and no acute injury reported.     PLAN:   Knee Arthroscopy: Discussed findings with patient.  We talked about treatment options.  I feel the best treatment would be knee arthroscopy. I have explained the nature of the procedure, the risks and recovery time with the patient.    She does NOT want the meniscus repaired, even if repairable. She understands that osteoarthritis could occur, particularly if a repairable tear is removed.       NORMAN [facility] MD  Dept. Orthopedic Surgery  [facility]

## 2022-09-07 NOTE — LETTER
[address]  [place] [address]  Phone: [phone]    September 7, 2022        Jessica ACUÑA La  [address] [facility] [address]          To whom it may concern:    RE: Jessica ARIANNE Tovar    Please allow Jessica to sit for 5-10 minutes every hour while at work for the next month and allow her to take a break as needed due to her knee injury. Thank you.     Please contact me for questions or concerns.      Sincerely,        Don Prieto PA-C

## 2022-09-07 NOTE — PROGRESS NOTES
Nara is a 42 year old who is being evaluated via a billable telephone visit.      What phone number would you like to be contacted at? [phone]  How would you like to obtain your AVS? MyChart    Assessment & Plan     ICD-10-CM    1. Tear of lateral meniscus of right knee, current, unspecified tear type, initial encounter  S83.281A diclofenac (VOLTAREN) 1 % topical gel     Orthopedic  Referral   2. Adjustment disorder with mixed anxiety and depressed mood  F43.23        1. Worsening right knee pain over the past 6 weeks after helping a friend move. MRI yesterday reveals a torn lateral meniscus along with multiple ligamentous sprains and a bony contusion. She does not tolerate oral NSAIDs very well so will try Voltaren gel instead. I recommend an over the counter soft knee brace/sleeve, especially to wear during work as a . I recommend she she take a break at work for 5-10 minutes every hour and be able to rest as needed. Letter provided with these restrictions. Continue with ice and elevation. Will place an orthopedics referral for further evaluation and treatment.    2. Her mood has improved on the sertraline 150 mg and she denies any side effects. She is now off the phentermine.       Don Prieto PA-C  [facility]   Nara is a 42 year old female, presenting for the following health issues:  No chief complaint on file.      HPI     She injured her right knee while helping a friend move 6 weeks ago. She did not notice pain immediately but had pain and swelling the next day. Ever since then, she has had continued swelling/fluid and pain. The pain is worse with prolonged walking. She was seen in urgent care last month with a normal x-ray and then was seen last week in the ED and it was recommended she undergo an MRI. She completed the MRI yesterday and would like to discuss results and plan moving forward, especially as it pertains to work. She has missed  some work due to the pain but has also tried to work and push through it. She needs to work because she needs the money but does not want to do any more damage. She has been taking ibuprofen and Tylenol without much benefit. She is also trying to elevate and ice the knee as much as possible.       Review of Systems   Constitutional, cardiovascular, pulmonary, gi and gu systems are negative, except as otherwise noted.      Objective         Vitals:   No vitals were obtained today due to virtual visit.    Physical Exam   healthy, alert and no distress  PSYCH: Alert and oriented times 3; coherent speech, normal   rate and volume, able to articulate logical thoughts, able   to abstract reason, no tangential thoughts, no hallucinations   or delusions. Her affect is normal.  RESP: No cough, no audible wheezing, able to talk in full sentences  Remainder of exam unable to be completed due to telephone visits      MRI Right Knee 9/6/22    Impression:  1. Multidirectional tear of body, posterior horn and likely anterior  horn of the lateral meniscus.  2. Query posterolateral corner injury:  a. Posterolateral capsular sprain.  b. Low grade sprain of fibular collateral ligament.  c. Likely moderate grade sprain popliteofibular ligament.  3. Mild-to-moderate grade sprain of the medial meniscofemoral and  meniscotibial ligaments.  4. Subchondral/subcortical edema at the anterior lateral tibial  plateau, likely bone contusion.  5. Edema deep and superficial to the iliotibial band, query iliotibial  band friction syndrome.     Phone call duration: 17 minutes

## 2022-09-07 NOTE — PATIENT INSTRUCTIONS
I recommend an over the counter knee brace/sleeve to wear when walking.  Try to take a break at work at least every hour or more frequently to sit down.  Will place a referral to orthopedics.  Will try Voltaren gel on the knee.

## 2022-09-07 NOTE — LETTER
2022         RE: Jessica Tovar  [address] [facility] [address]        Dear Colleague,    Thank you for referring your patient, Jessica Tovar, to the [facility]. Please see a copy of my visit note below.    SUBJECTIVE:   Jessica Tovar is a 42 year old female who is seen in consultation at the request of Don Prieto PA-C for evaluation of right knee pain.    HPI: Worsening right knee pain over the past 6 weeks after helping a friend move.  Reports banging the knee occasionally, tripping occasionally since the knee started to hurt.  Present symptoms: constant pain  Some positions feel better  Worse with walking and if pivots.   Getting up from sitting is painful.  Locks, +feeling of giving-way,  No swelling lately   Stairs more painful  Drags leg sometimes  Pain diffuse    Treatments tried to this point: NSAIDs, ice, wrapping.    Previous knee issues: no previous knee problems     Past Medical History:   Past Medical History:   Diagnosis Date     Migraine without aura, with intractable migraine, so stated, without mention of status migrainosus     Hx of migraines. Takes Midrin prn migraine per Dr. Eddy.     Varicella without mention of complication     Had chicken pox as a child ages 3 and 8     Past Surgical History:   Past Surgical History:   Procedure Laterality Date     HC DILATION/CURETTAGE DIAG/THER NON OB  2001     HC REMOVE TONSILS/ADENOIDS,<13 Y/O       HYSTERECTOMY, PAP NO LONGER INDICATED       LAPAROSCOPIC ASSISTED HYSTERECTOMY VAGINAL N/A 2018    Procedure: LAPAROSCOPIC ASSISTED HYSTERECTOMY VAGINAL;  Laparoscopic Assisted Vaginal Hysterectomy with Bilateral salpingo-opherectomy, cystoscopy;  Surgeon: Molly Vo MD;  Location: [place] OR     [facility]  DELIVERY ONLY  10/27/1999     [facility]  DELIVERY ONLY  2002    BTL at the time of      Family History:   Family History   Problem Relation Age of Onset      Diabetes Mother      Depression Mother      Alcohol/Drug Father         ETOH     Diabetes Father      Depression Sister         older     Arthritis Maternal Grandmother      Heart Disease Paternal Grandfather      Diabetes Brother         younger     Other - See Comments Son         10/27/1999     Other - See Comments Son         2002     Social History:   Social History     Tobacco Use     Smoking status: Former Smoker     Packs/day: 0.25     Years: 6.00     Pack years: 1.50     Types: Cigarettes     Quit date: 8/15/2001     Years since quittin.0     Smokeless tobacco: Never Used     Tobacco comment: Stopped smoking when found out pg again.   Substance Use Topics     Alcohol use: Yes     Comment: Would drink 3 to 4  per month.     Works as , school kitchen.    Review of Systems:  Constitutional:  NEGATIVE for fever, chills, change in weight  Integumentary/Skin:  NEGATIVE for worrisome rashes, moles or lesions  Eyes:  NEGATIVE for vision changes or irritation  ENT/Mouth:  NEGATIVE for ear, mouth and throat problems  Resp:  NEGATIVE for significant cough or SOB  Breast:  NEGATIVE for masses, tenderness or discharge  CV:  NEGATIVE for chest pain, palpitations or peripheral edema  GI:  NEGATIVE for nausea, abdominal pain, heartburn, or change in bowel habits  :  Negative   Musculoskeletal:  See HPI above  Neuro:  NEGATIVE for weakness, dizziness or paresthesias  Endocrine:  NEGATIVE for temperature intolerance, skin/hair changes  Heme/allergy/immune:  NEGATIVE for bleeding problems  Psychiatric:  NEGATIVE for changes in mood or affect      OBJECTIVE:  Physical Exam:  /73 (BP Location: Left arm, Patient Position: Sitting, Cuff Size: Adult Regular)   Pulse 78   LMP 2018   SpO2 95%   General Appearance: healthy, alert and no distress   Skin: no suspicious lesions or rashes  Neuro: Normal strength and tone, mentation intact and speech normal  Vascular: good pulses, and cappillary refill    Lymph: no lymphadenopathy   Psych:  mentation appears normal and affect normal/bright  Resp: no increased work of breathing     Right Knee Exam:  Gait: walks with antalgic gait favoring right side  Alignment: normal   Squat: 50 % painful.    Patellofemoral joint: no crepitations in the patellofemoral joint.  Effusion: moderate  ROM: 7*-130*  Tender: lateral joint line  Masses: none  Ligaments:   Lachman's: stable   Anterior/Posterior drawer: stable,   Varus/Valgus stress: stable to varus and valgus stress  McMurrays: positive medially, positive laterally      MRI:    From 9/6/22, right knee, reviewed in the office with the patient today:     1. Multidirectional tear of body, posterior horn and likely anterior  horn of the lateral meniscus.  2. Query posterolateral corner injury:  a. Posterolateral capsular sprain.  b. Low grade sprain of fibular collateral ligament.  c. Likely moderate grade sprain popliteofibular ligament.  3. Mild-to-moderate grade sprain of the medial meniscofemoral and  meniscotibial ligaments.  4. Subchondral/subcortical edema at the anterior lateral tibial  plateau, likely bone contusion.  5. Edema deep and superficial to the iliotibial band, query iliotibial  band friction syndrome.      She had an xray of the knee which we don't have results for.    ASSESSMENT:   Lateral meniscus tear right knee-- anterior, diffuse  Possible PLC injury but not laxity on exam and no acute injury reported.     PLAN:   Knee Arthroscopy: Discussed findings with patient.  We talked about treatment options.  I feel the best treatment would be knee arthroscopy. I have explained the nature of the procedure, the risks and recovery time with the patient.    She does NOT want the meniscus repaired, even if repairable. She understands that osteoarthritis could occur, particularly if a repairable tear is removed.       NORMAN Yost MD  Dept. Orthopedic Surgery  [facility]       Again, thank you for  allowing me to participate in the care of your patient.        Sincerely,        Juanjo Yost MD

## 2022-09-09 ENCOUNTER — TELEPHONE (OUTPATIENT)
Dept: ORTHOPEDICS | Facility: CLINIC | Age: 42
End: 2022-09-09

## 2022-09-09 NOTE — TELEPHONE ENCOUNTER
Type of surgery: RIGHT  KNEE ARTHROSCOPIC MENISECTOMY (Right)      CPT 15092     Complex tear of lateral meniscus of right knee, unspecified whether old or current tear, initial encounter S83.271A     Tear of lateral meniscus of right knee, current, unspecified tear type, initial encounter S83.281A    Location of surgery: [facility]  Date and time of surgery: 9/23  Surgeon: Dr. Yost   Pre-Op Appt Date: 9/15  Post-Op Appt Date: 10/11   Packet sent out: Yes  Pre-cert/Authorization completed: JOSEF with Util mgmt at [employer] hoping someone calls me back for workaround to get approval.  Faxed pa form and clinicals to [phone] due to no response from      Date:9-13-22    Danna Langley  Financial Securing/Prior Auth Dept  [phone]

## 2022-09-13 ENCOUNTER — HOSPITAL ENCOUNTER (OUTPATIENT)
Facility: AMBULATORY SURGERY CENTER | Age: 42
End: 2022-09-13
Attending: ORTHOPAEDIC SURGERY | Admitting: ORTHOPAEDIC SURGERY
Payer: COMMERCIAL

## 2022-09-13 DIAGNOSIS — S83.241A OTHER TEAR OF MEDIAL MENISCUS OF RIGHT KNEE AS CURRENT INJURY, INITIAL ENCOUNTER: ICD-10-CM

## 2022-09-15 ENCOUNTER — OFFICE VISIT (OUTPATIENT)
Dept: FAMILY MEDICINE | Facility: OTHER | Age: 42
End: 2022-09-15
Payer: COMMERCIAL

## 2022-09-15 VITALS
WEIGHT: 186.5 LBS | BODY MASS INDEX: 31.07 KG/M2 | DIASTOLIC BLOOD PRESSURE: 77 MMHG | OXYGEN SATURATION: 100 % | TEMPERATURE: 97.8 F | SYSTOLIC BLOOD PRESSURE: 115 MMHG | HEIGHT: 65 IN | HEART RATE: 94 BPM

## 2022-09-15 DIAGNOSIS — R61 HYPERHIDROSIS: ICD-10-CM

## 2022-09-15 DIAGNOSIS — S83.281A TEAR OF LATERAL MENISCUS OF RIGHT KNEE, CURRENT, UNSPECIFIED TEAR TYPE, INITIAL ENCOUNTER: ICD-10-CM

## 2022-09-15 DIAGNOSIS — Z01.818 PRE-OP EXAM: Primary | ICD-10-CM

## 2022-09-15 DIAGNOSIS — R23.3 EASY BRUISING: ICD-10-CM

## 2022-09-15 LAB
ALBUMIN SERPL-MCNC: 3.7 G/DL (ref 3.4–5)
ALP SERPL-CCNC: 86 U/L (ref 40–150)
ALT SERPL W P-5'-P-CCNC: 37 U/L (ref 0–50)
ANION GAP SERPL CALCULATED.3IONS-SCNC: 4 MMOL/L (ref 3–14)
AST SERPL W P-5'-P-CCNC: 27 U/L (ref 0–45)
BILIRUB SERPL-MCNC: 0.5 MG/DL (ref 0.2–1.3)
BUN SERPL-MCNC: 13 MG/DL (ref 7–30)
CALCIUM SERPL-MCNC: 8.5 MG/DL (ref 8.5–10.1)
CHLORIDE BLD-SCNC: 109 MMOL/L (ref 94–109)
CO2 SERPL-SCNC: 29 MMOL/L (ref 20–32)
CREAT SERPL-MCNC: 0.69 MG/DL (ref 0.52–1.04)
ERYTHROCYTE [DISTWIDTH] IN BLOOD BY AUTOMATED COUNT: 13.7 % (ref 10–15)
GFR SERPL CREATININE-BSD FRML MDRD: >90 ML/MIN/1.73M2
GLUCOSE BLD-MCNC: 60 MG/DL (ref 70–99)
HCT VFR BLD AUTO: 44.8 % (ref 35–47)
HGB BLD-MCNC: 14.7 G/DL (ref 11.7–15.7)
MCH RBC QN AUTO: 29.7 PG (ref 26.5–33)
MCHC RBC AUTO-ENTMCNC: 32.8 G/DL (ref 31.5–36.5)
MCV RBC AUTO: 91 FL (ref 78–100)
PLATELET # BLD AUTO: 230 10E3/UL (ref 150–450)
POTASSIUM BLD-SCNC: 4.3 MMOL/L (ref 3.4–5.3)
PROT SERPL-MCNC: 7.1 G/DL (ref 6.8–8.8)
RBC # BLD AUTO: 4.95 10E6/UL (ref 3.8–5.2)
SODIUM SERPL-SCNC: 142 MMOL/L (ref 133–144)
TSH SERPL DL<=0.005 MIU/L-ACNC: 1.36 MU/L (ref 0.4–4)
WBC # BLD AUTO: 7.6 10E3/UL (ref 4–11)

## 2022-09-15 PROCEDURE — 80053 COMPREHEN METABOLIC PANEL: CPT | Performed by: PHYSICIAN ASSISTANT

## 2022-09-15 PROCEDURE — 84443 ASSAY THYROID STIM HORMONE: CPT | Performed by: PHYSICIAN ASSISTANT

## 2022-09-15 PROCEDURE — 85027 COMPLETE CBC AUTOMATED: CPT | Performed by: PHYSICIAN ASSISTANT

## 2022-09-15 PROCEDURE — 36415 COLL VENOUS BLD VENIPUNCTURE: CPT | Performed by: PHYSICIAN ASSISTANT

## 2022-09-15 PROCEDURE — 99214 OFFICE O/P EST MOD 30 MIN: CPT | Performed by: PHYSICIAN ASSISTANT

## 2022-09-15 ASSESSMENT — PAIN SCALES - GENERAL: PAINLEVEL: NO PAIN (0)

## 2022-09-15 NOTE — PROGRESS NOTES
[address]  [place] [address]  Phone: [phone]  Primary Provider: Damaso Eddy  Pre-op Performing Provider: AVNI SWANSON    PREOPERATIVE EVALUATION:  Today's date: 9/15/2022    Jessica Tovar is a 42 year old female who presents for a preoperative evaluation.    Surgical Information:  Surgery/Procedure: RIGHT KNEE ARTHROSCOPIC MENISECTOMY  Surgery Location: [facility]  Surgeon: Dr.John Yost  Surgery Date: 9/23/22  Time of Surgery: TBD  Where patient plans to recover: At home alone  Fax number for surgical facility: Note does not need to be faxed, will be available electronically in Epic.    Type of Anesthesia Anticipated: General    Assessment & Plan     The proposed surgical procedure is considered INTERMEDIATE risk.      ICD-10-CM    1. Pre-op exam  Z01.818    2. Tear of lateral meniscus of right knee, current, unspecified tear type, initial encounter  S83.281A    3. Easy bruising  R23.8 CBC with platelets     Comprehensive metabolic panel (BMP + Alb, Alk Phos, ALT, AST, Total. Bili, TP)   4. Hyperhidrosis  R61 TSH with free T4 reflex     Comprehensive metabolic panel (BMP + Alb, Alk Phos, ALT, AST, Total. Bili, TP)       Will check updated labs today from previous visit.    Cleared for surgery.    Medication Instructions:  Hold ibuprofen until after surgery    RECOMMENDATION:  APPROVAL GIVEN to proceed with proposed procedure, without further diagnostic evaluation.      Subjective     HPI related to upcoming procedure: She has been noticing worsening right knee pain over the past few months and was recently found to have a lateral meniscal tear so will be undergoing menisectomy with Dr. Yost on 9/23/22.     Preop Questions 9/15/2022   1. Have you ever had a heart attack or stroke? No   2. Have you ever had surgery on your heart or blood vessels, such as a stent placement, a coronary artery bypass, or surgery on  an artery in your head, neck, heart, or legs? No   3. Do you have chest pain with activity? No   4. Do you have a history of  heart failure? No   5. Do you currently have a cold, bronchitis or symptoms of other infection? No   6. Do you have a cough, shortness of breath, or wheezing? No   7. Do you or anyone in your family have previous history of blood clots? No   8. Do you or does anyone in your family have a serious bleeding problem such as prolonged bleeding following surgeries or cuts? No   9. Have you ever had problems with anemia or been told to take iron pills? No   10. Have you had any abnormal blood loss such as black, tarry or bloody stools, or abnormal vaginal bleeding? No   11. Have you ever had a blood transfusion? YES - after child birth   11a. Have you ever had a transfusion reaction? No   12. Are you willing to have a blood transfusion if it is medically needed before, during, or after your surgery? Yes   13. Have you or any of your relatives ever had problems with anesthesia? No   14. Do you have sleep apnea, excessive snoring or daytime drowsiness? No   15. Do you have any artifical heart valves or other implanted medical devices like a pacemaker, defibrillator, or continuous glucose monitor? No   16. Do you have artificial joints? No   17. Are you allergic to latex? No   18. Is there any chance that you may be pregnant? No       Health Care Directive:  Patient does not have a Health Care Directive or Living Will: Discussed advance care planning with patient; however, patient declined at this time.    Status of Chronic Conditions:  See problem list for active medical problems.  Problems all longstanding and stable, except as noted/documented.  See ROS for pertinent symptoms related to these conditions.      Review of Systems  CONSTITUTIONAL: NEGATIVE for fever, chills, change in weight  INTEGUMENTARY/SKIN: NEGATIVE for worrisome rashes, moles or lesions  EYES: NEGATIVE for vision changes or  irritation  ENT/MOUTH: NEGATIVE for ear, mouth and throat problems  RESP: NEGATIVE for significant cough or SOB  CV: NEGATIVE for chest pain, palpitations or peripheral edema  GI: NEGATIVE for nausea, abdominal pain, heartburn, or change in bowel habits  : NEGATIVE for frequency, dysuria, or hematuria  MUSCULOSKELETAL: +right knee pain.   NEURO: NEGATIVE for weakness, dizziness or paresthesias  ENDOCRINE: NEGATIVE for temperature intolerance, skin/hair changes  HEME: NEGATIVE for bleeding problems  PSYCHIATRIC: NEGATIVE for changes in mood or affect    Patient Active Problem List    Diagnosis Date Noted     Other tear of medial meniscus of right knee as current injury, initial encounter 2022     Priority: Medium     Added automatically from request for surgery [id number]       Dysmenorrhea 2018     Priority: Medium     Endometriosis of pelvic peritoneum 2018     Priority: Medium     Menorrhagia 2018     Priority: Medium     Elevated blood pressure reading without diagnosis of hypertension 2018     Priority: Medium     Previous  delivery, antepartum condition or complication 2002     Priority: Medium     Intractable migraine with aura      Priority: Medium     Problem list name updated by automated process. Provider to review        Past Medical History:   Diagnosis Date     Migraine without aura, with intractable migraine, so stated, without mention of status migrainosus     Hx of migraines. Takes Midrin prn migraine per Dr. Eddy.     Tear of medial meniscus of left knee, current      Varicella without mention of complication     Had chicken pox as a child ages 3 and 8     Past Surgical History:   Procedure Laterality Date     HC DILATION/CURETTAGE DIAG/THER NON OB  2001     HC REMOVE TONSILS/ADENOIDS,<11 Y/O       HYSTERECTOMY, PAP NO LONGER INDICATED       LAPAROSCOPIC ASSISTED HYSTERECTOMY VAGINAL N/A 2018    Procedure: LAPAROSCOPIC ASSISTED HYSTERECTOMY  "VAGINAL;  Laparoscopic Assisted Vaginal Hysterectomy with Bilateral salpingo-opherectomy, cystoscopy;  Surgeon: Molly Vo MD;  Location: [facility] OR     [facility]  DELIVERY ONLY  10/27/1999     [facility]  DELIVERY ONLY  2002    BTL at the time of      Current Outpatient Medications   Medication Sig Dispense Refill     estradiol (ESTRACE) 2 MG tablet Take 1 tablet (2 mg) by mouth daily 90 tablet 3     hydrOXYzine (VISTARIL) 25 MG capsule Take 1 capsule (25 mg) by mouth 3 times daily as needed for anxiety With 2-3 capsules before bed 60 capsule 0     sertraline (ZOLOFT) 100 MG tablet Take 1.5 tablets (150 mg) by mouth daily 135 tablet 1       Allergies   Allergen Reactions     Hydrocodone-Acetaminophen Itching     Morphine Itching     Ofloxacin      floxin        Social History     Tobacco Use     Smoking status: Former Smoker     Packs/day: 0.25     Years: 6.00     Pack years: 1.50     Types: Cigarettes     Quit date: 8/15/2001     Years since quittin.0     Smokeless tobacco: Never Used     Tobacco comment: Stopped smoking when found out pg again.   Substance Use Topics     Alcohol use: Yes     Comment: Would drink 3 to 4  per month.     History   Drug Use No         Objective     /77 (Cuff Size: Adult Regular)   Pulse 94   Temp 97.8  F (36.6  C) (Oral)   Ht 1.66 m (5' 5.35\")   Wt 84.6 kg (186 lb 8 oz)   LMP 2018   SpO2 100%   BMI 30.70 kg/m      Physical Exam    GENERAL APPEARANCE: healthy, alert and no distress     EYES: EOMI, PERRL     HENT: ear canals and TM's normal and nose and mouth without ulcers or lesions     NECK: no adenopathy, no asymmetry, masses, or scars and thyroid normal to palpation     RESP: lungs clear to auscultation - no rales, rhonchi or wheezes     CV: regular rate and rhythm, normal S1 S2, no S3 or S4 and no murmur, click or rub     ABDOMEN:  soft, nontender, no HSM or masses and bowel sounds normal     MS: extremities normal- no gross " deformities noted, no evidence of inflammation in joints, FROM in all extremities. Right lateral knee tendernes.      SKIN: no suspicious lesions or rashes     NEURO: Normal strength and tone, sensory exam grossly normal, mentation intact and speech normal. Gait is antalgic.      PSYCH: mentation appears normal. and affect normal/bright     LYMPHATICS: No cervical adenopathy    Recent Labs   Lab Test 03/26/21  1705   HGB 15.0         POTASSIUM 4.4   CR 0.89        Diagnostics:  No labs were ordered during this visit.   No EKG required, no history of coronary heart disease, significant arrhythmia, peripheral arterial disease or other structural heart disease.    Revised Cardiac Risk Index (RCRI):  The patient has the following serious cardiovascular risks for perioperative complications:   - No serious cardiac risks = 0 points     RCRI Interpretation: 0 points: Class I (very low risk - 0.4% complication rate)         Signed Electronically by: Don Prieto PA-C  Copy of this evaluation report is provided to requesting physician.

## 2022-09-15 NOTE — PATIENT INSTRUCTIONS
Preparing for Your Surgery  Getting started  A nurse will call you to review your health history and instructions. They will give you an arrival time based on your scheduled surgery time. Please be ready to share:    Your doctor's clinic name and phone number    Your medical, surgical and anesthesia history    A list of allergies and sensitivities    A list of medicines, including herbal treatments and over-the-counter drugs    Whether the patient has a legal guardian (ask how to send us the papers in advance)  Please tell us if you're pregnant--or if there's any chance you might be pregnant. Some surgeries may injure a fetus (unborn baby), so they require a pregnancy test. Surgeries that are safe for a fetus don't always need a test, and you can choose whether to have one.   If you have a child who's having surgery, please ask for a copy of Preparing for Your Child's Surgery.    Preparing for surgery    Within 30 days of surgery: Have a pre-op exam (sometimes called an H&P, or History and Physical). This can be done at a clinic or pre-operative center.  ? If you're having a , you may not need this exam. Talk to your care team.    At your pre-op exam, talk to your care team about all medicines you take. If you need to stop any medicines before surgery, ask when to start taking them again.  ? We do this for your safety. Many medicines can make you bleed too much during surgery. Some change how well surgery (anesthesia) drugs work.    Call your insurance company to let them know you're having surgery. (If you don't have insurance, call [phone].)    Call your clinic if there's any change in your health. This includes signs of a cold or flu (sore throat, runny nose, cough, rash, fever). It also includes a scrape or scratch near the surgery site.    If you have questions on the day of surgery, call your hospital or surgery center.  COVID testing  You may need to be tested for COVID-19 before having  surgery. If so, we will give you instructions.  Eating and drinking guidelines  For your safety: Unless your surgeon tells you otherwise, follow the guidelines below.    Eat and drink as usual until 8 hours before surgery. After that, no food or milk.    Drink clear liquids until 2 hours before surgery. These are liquids you can see through, like water, Gatorade and Propel Water. You may also have black coffee and tea (no cream or milk).    Nothing by mouth within 2 hours of surgery. This includes gum, candy and breath mints.    If you drink alcohol: Stop drinking it the night before surgery.    If your care team tells you to take medicine on the morning of surgery, it's okay to take it with a sip of water.  Preventing infection    Shower or bathe the night before and morning of your surgery. Follow the instructions your clinic gave you. (If no instructions, use regular soap.)    Don't shave or clip hair near your surgery site. We'll remove the hair if needed.    Don't smoke or vape the morning of surgery. You may chew nicotine gum up to 2 hours before surgery. A nicotine patch is okay.  ? Note: Some surgeries require you to completely quit smoking and nicotine. Check with your surgeon.    Your care team will make every effort to keep you safe from infection. We will:  ? Clean our hands often with soap and water (or an alcohol-based hand rub).  ? Clean the skin at your surgery site with a special soap that kills germs.  ? Give you a special gown to keep you warm. (Cold raises the risk of infection.)  ? Wear special hair covers, masks, gowns and gloves during surgery.  ? Give antibiotic medicine, if prescribed. Not all surgeries need antibiotics.  What to bring on the day of surgery    Photo ID and insurance card    Copy of your health care directive, if you have one    Glasses and hearing aides (bring cases)  ? You can't wear contacts during surgery    Inhaler and eye drops, if you use them (tell us about these when  you arrive)    CPAP machine or breathing device, if you use them    A few personal items, if spending the night    If you have . . .  ? A pacemaker, ICD (cardiac defibrillator) or other implant: Bring the ID card.  ? An implanted stimulator: Bring the remote control.  ? A legal guardian: Bring a copy of the certified (court-stamped) guardianship papers.  Please remove any jewelry, including body piercings. Leave jewelry and other valuables at home.  If you're going home the day of surgery    You must have a responsible adult drive you home. They should stay with you overnight as well.    If you don't have someone to stay with you, and you aren't safe to go home alone, we may keep you overnight. Insurance often won't pay for this.  After surgery  If it's hard to control your pain or you need more pain medicine, please call your surgeon's office.  Questions?   If you have any questions for your care team, list them here: _________________________________________________________________________________________________________________________________________________________________________ ____________________________________ ____________________________________ ____________________________________  For informational purposes only. Not to replace the advice of your health care provider. Copyright   2003, 2019 [facility]. All rights reserved. Clinically reviewed by Apple Corey MD. [employer]ze [id number] - REV 07/21.

## 2022-09-15 NOTE — TELEPHONE ENCOUNTER
Called to check if PA fax was received and it was not. Because of work around needed I was transferred again to the [employer] number and again it just went to voicemail.   Refaxed to [phone] and on cover sheet noted that service site defaults to oon but is not oon. Will call tomorrow to see if it was received.    Not received so submitted online.  Tracking ID [id number]  Ref# [id number]

## 2022-09-16 NOTE — TELEPHONE ENCOUNTER
Scheduling: Please reschedule patient out at least 2 weeks from today. Prior auth will not be received in time for her surgery.     My apologies on behalf of [employer] and their glitch in the system that causes such a delay in getting authorization in at timely manner.

## 2022-09-20 RX ORDER — CEFAZOLIN SODIUM 2 G/100ML
2 INJECTION, SOLUTION INTRAVENOUS SEE ADMIN INSTRUCTIONS
Status: CANCELLED | OUTPATIENT
Start: 2022-09-23

## 2022-09-20 RX ORDER — CEFAZOLIN SODIUM 2 G/100ML
2 INJECTION, SOLUTION INTRAVENOUS
Status: CANCELLED | OUTPATIENT
Start: 2022-09-23

## 2022-09-20 RX ORDER — LIDOCAINE 40 MG/G
CREAM TOPICAL
Status: CANCELLED | OUTPATIENT
Start: 2022-09-20

## 2022-09-20 RX ORDER — ONDANSETRON 2 MG/ML
4 INJECTION INTRAMUSCULAR; INTRAVENOUS EVERY 30 MIN PRN
Status: CANCELLED | OUTPATIENT
Start: 2022-09-20

## 2022-09-20 RX ORDER — HYDRALAZINE HYDROCHLORIDE 20 MG/ML
2.5-5 INJECTION INTRAMUSCULAR; INTRAVENOUS EVERY 10 MIN PRN
Status: CANCELLED | OUTPATIENT
Start: 2022-09-20

## 2022-09-20 RX ORDER — LORAZEPAM 2 MG/ML
.5-1 INJECTION INTRAMUSCULAR
Status: CANCELLED | OUTPATIENT
Start: 2022-09-20

## 2022-09-20 RX ORDER — ALBUTEROL SULFATE 0.83 MG/ML
2.5 SOLUTION RESPIRATORY (INHALATION) EVERY 4 HOURS PRN
Status: CANCELLED | OUTPATIENT
Start: 2022-09-20

## 2022-09-20 RX ORDER — MEPERIDINE HYDROCHLORIDE 25 MG/ML
12.5 INJECTION INTRAMUSCULAR; INTRAVENOUS; SUBCUTANEOUS
Status: CANCELLED | OUTPATIENT
Start: 2022-09-20

## 2022-09-20 RX ORDER — ONDANSETRON 4 MG/1
4 TABLET, ORALLY DISINTEGRATING ORAL EVERY 30 MIN PRN
Status: CANCELLED | OUTPATIENT
Start: 2022-09-20

## 2022-09-20 RX ORDER — KETOROLAC TROMETHAMINE 30 MG/ML
15 INJECTION, SOLUTION INTRAMUSCULAR; INTRAVENOUS EVERY 6 HOURS PRN
Status: CANCELLED | OUTPATIENT
Start: 2022-09-20 | End: 2022-09-25

## 2022-09-20 RX ORDER — CELECOXIB 200 MG/1
400 CAPSULE ORAL ONCE
Status: CANCELLED | OUTPATIENT
Start: 2022-09-23

## 2022-09-20 RX ORDER — FENTANYL CITRATE 50 UG/ML
25 INJECTION, SOLUTION INTRAMUSCULAR; INTRAVENOUS EVERY 5 MIN PRN
Status: CANCELLED | OUTPATIENT
Start: 2022-09-20

## 2022-09-20 RX ORDER — ACETAMINOPHEN 325 MG/1
975 TABLET ORAL ONCE
Status: CANCELLED | OUTPATIENT
Start: 2022-09-20 | End: 2022-09-20

## 2022-09-20 RX ORDER — SODIUM CHLORIDE, SODIUM LACTATE, POTASSIUM CHLORIDE, CALCIUM CHLORIDE 600; 310; 30; 20 MG/100ML; MG/100ML; MG/100ML; MG/100ML
INJECTION, SOLUTION INTRAVENOUS CONTINUOUS
Status: CANCELLED | OUTPATIENT
Start: 2022-09-20

## 2022-09-20 RX ORDER — OXYCODONE HYDROCHLORIDE 5 MG/1
5 TABLET ORAL EVERY 4 HOURS PRN
Status: CANCELLED | OUTPATIENT
Start: 2022-09-20

## 2022-09-20 RX ORDER — FENTANYL CITRATE 50 UG/ML
25 INJECTION, SOLUTION INTRAMUSCULAR; INTRAVENOUS
Status: CANCELLED | OUTPATIENT
Start: 2022-09-20

## 2022-09-20 NOTE — TELEPHONE ENCOUNTER
Spoke with Chantel at [facility]. She states case is being reviewed by medical director and to check back at end of day.  Informed scheduling that if we do not hear anything by end of day we will need to reschedule the patient.

## 2022-09-21 NOTE — TELEPHONE ENCOUNTER
Left a voice message for patient noting that surgery on 09/23/2022 has been canceled. [employer] has not gotten approval from her insurance [facility]. I did offer to rescheduled for 10/07 next available

## 2022-09-22 ENCOUNTER — MYC MEDICAL ADVICE (OUTPATIENT)
Dept: FAMILY MEDICINE | Facility: OTHER | Age: 42
End: 2022-09-22

## 2022-09-22 DIAGNOSIS — S83.281A TEAR OF LATERAL MENISCUS OF RIGHT KNEE, CURRENT, UNSPECIFIED TEAR TYPE, INITIAL ENCOUNTER: Primary | ICD-10-CM

## 2022-09-22 NOTE — TELEPHONE ENCOUNTER
Spoke with patient about rescheduling surgery patient has decided to go back to work and not have surgery at this time.

## 2022-09-28 NOTE — TELEPHONE ENCOUNTER
Fax received on 9/27/22 from [employer] asking for more information to determine medical necessity.   Pain severity rating of at least 3/10 AND some form of conservative treatment must have been attempted.    Faxed back stating patient has not tried conservative treatment (injections/PT) since the injury was quite new, and that she states she has missed too much work and was cancelling surgery at this time.     [phone]    Faxed to [phone]    Ref# [id number]

## 2022-09-30 NOTE — TELEPHONE ENCOUNTER
Fax received from [facility] 9/29/22 at 4:36pm, with APPROVAL for knee surgery # [id number], however patient cancelled and per her mychart messages, she wants to see a different surgeon/facility.

## 2024-08-22 ENCOUNTER — OFFICE VISIT (OUTPATIENT)
Dept: FAMILY MEDICINE | Facility: OTHER | Age: 44
End: 2024-08-22

## 2024-08-22 VITALS
TEMPERATURE: 97.8 F | OXYGEN SATURATION: 99 % | WEIGHT: 173 LBS | RESPIRATION RATE: 19 BRPM | HEIGHT: 66 IN | SYSTOLIC BLOOD PRESSURE: 146 MMHG | HEART RATE: 110 BPM | DIASTOLIC BLOOD PRESSURE: 92 MMHG | BODY MASS INDEX: 27.8 KG/M2

## 2024-08-22 DIAGNOSIS — R30.0 DYSURIA: Primary | ICD-10-CM

## 2024-08-22 DIAGNOSIS — Z12.31 VISIT FOR SCREENING MAMMOGRAM: Primary | ICD-10-CM

## 2024-08-22 DIAGNOSIS — N80.30 ENDOMETRIOSIS OF PELVIC PERITONEUM: ICD-10-CM

## 2024-08-22 DIAGNOSIS — R81 GLUCOSURIA: ICD-10-CM

## 2024-08-22 DIAGNOSIS — R31.0 GROSS HEMATURIA: ICD-10-CM

## 2024-08-22 PROBLEM — N94.6 DYSMENORRHEA: Status: RESOLVED | Noted: 2018-07-24 | Resolved: 2024-08-22

## 2024-08-22 PROBLEM — N92.0 MENORRHAGIA: Status: RESOLVED | Noted: 2018-06-13 | Resolved: 2024-08-22

## 2024-08-22 LAB
ALBUMIN UR-MCNC: 100 MG/DL
APPEARANCE UR: CLEAR
BACTERIA #/AREA URNS HPF: ABNORMAL /HPF
BILIRUB UR QL STRIP: ABNORMAL
COLOR UR AUTO: YELLOW
GLUCOSE UR STRIP-MCNC: 100 MG/DL
HGB UR QL STRIP: ABNORMAL
KETONES UR STRIP-MCNC: ABNORMAL MG/DL
LEUKOCYTE ESTERASE UR QL STRIP: ABNORMAL
MUCOUS THREADS #/AREA URNS LPF: PRESENT /LPF
NITRATE UR QL: NEGATIVE
PH UR STRIP: 6 [PH] (ref 5–7)
RBC #/AREA URNS AUTO: ABNORMAL /HPF
SP GR UR STRIP: >=1.03 (ref 1–1.03)
SQUAMOUS #/AREA URNS AUTO: ABNORMAL /LPF
UROBILINOGEN UR STRIP-ACNC: 0.2 E.U./DL
WBC #/AREA URNS AUTO: ABNORMAL /HPF

## 2024-08-22 PROCEDURE — 81001 URINALYSIS AUTO W/SCOPE: CPT | Performed by: PHYSICIAN ASSISTANT

## 2024-08-22 PROCEDURE — 99213 OFFICE O/P EST LOW 20 MIN: CPT | Performed by: PHYSICIAN ASSISTANT

## 2024-08-22 RX ORDER — NITROFURANTOIN 25; 75 MG/1; MG/1
100 CAPSULE ORAL 2 TIMES DAILY
Qty: 20 CAPSULE | Refills: 0 | Status: SHIPPED | OUTPATIENT
Start: 2024-08-22

## 2024-08-22 ASSESSMENT — PAIN SCALES - GENERAL: PAINLEVEL: NO PAIN (0)

## 2024-08-22 NOTE — PROGRESS NOTES
"  Assessment & Plan     Gross hematuria  Endometriosis of pelvic peritoneum  We have a sb discussion with the patient today the fact that gross hematuria can be related to kidney stones but also can be a much more sinister cause.  She does not have insurance currently.  Advised that I am willing to place a urinalysis for her and then treat any type of urinary tract infection that we might see present however I am worried about the underlying cause being much more problematic than what we can review with just a urinalysis.  She declines any further evaluation at this point in time.  - UA Macroscopic with reflex to Microscopic and Culture - Lab Collect; Future    Visit for screening mammogram  Advise screening in the near future.    BMI  Estimated body mass index is 27.7 kg/m  as calculated from the following:    Height as of this encounter: 1.683 m (5' 6.26\").    Weight as of this encounter: 78.5 kg (173 lb).     Arely Bains is a 44 year old, presenting for the following health issues:  Urinary Problem      8/22/2024     9:39 AM   Additional Questions   Roomed by parul   Accompanied by self     History of Present Illness       Reason for visit:  Blood in urine  Symptom onset:  3-7 days ago  Symptoms include:  Pain/bleeding  Symptom intensity:  Moderate  Symptom progression:  Worsening  Had these symptoms before:  Yes  Has tried/received treatment for these symptoms:  Yes  Previous treatment was successful:  Yes  Prior treatment description:  Medication  What makes it worse:  Peeing  What makes it better:  Not peeing She is missing 7 dose(s) of medications per week.  She is not taking prescribed medications regularly due to cost of medication.         Review of Systems  Constitutional, HEENT, cardiovascular, pulmonary, GI, , musculoskeletal, neuro, skin, endocrine and psych systems are negative, except as otherwise noted.      Objective    BP (!) 148/96   Pulse 110   Temp 97.8  F (36.6  C) (Temporal)   " "Resp 19   Ht 1.683 m (5' 6.26\")   Wt 78.5 kg (173 lb)   LMP 07/23/2018   SpO2 99%   BMI 27.70 kg/m    Body mass index is 27.7 kg/m .  Physical Exam     GENERAL: alert and no distress  NECK: no adenopathy, no asymmetry, masses, or scars  RESP: lungs clear to auscultation - no rales, rhonchi or wheezes  CV: regular rate and rhythm, normal S1 S2, no S3 or S4, no murmur, click or rub, no peripheral edema  ABDOMEN: soft, nontender, no hepatosplenomegaly, no masses and bowel sounds normal  MS: no gross musculoskeletal defects noted, no edema    No results found for this or any previous visit (from the past 24 hour(s)).        Signed Electronically by: Roney Taylor PA-C    "

## 2025-07-04 ENCOUNTER — HOSPITAL ENCOUNTER (EMERGENCY)
Facility: CLINIC | Age: 45
Discharge: HOME | End: 2025-07-04
Attending: STUDENT IN AN ORGANIZED HEALTH CARE EDUCATION/TRAINING PROGRAM | Admitting: STUDENT IN AN ORGANIZED HEALTH CARE EDUCATION/TRAINING PROGRAM
Payer: COMMERCIAL

## 2025-07-04 ENCOUNTER — APPOINTMENT (OUTPATIENT)
Dept: GENERAL RADIOLOGY | Facility: CLINIC | Age: 45
End: 2025-07-04
Attending: STUDENT IN AN ORGANIZED HEALTH CARE EDUCATION/TRAINING PROGRAM
Payer: COMMERCIAL

## 2025-07-04 VITALS
WEIGHT: 180 LBS | DIASTOLIC BLOOD PRESSURE: 92 MMHG | SYSTOLIC BLOOD PRESSURE: 133 MMHG | OXYGEN SATURATION: 99 % | HEART RATE: 103 BPM | RESPIRATION RATE: 16 BRPM | TEMPERATURE: 96.9 F | HEIGHT: 67 IN | BODY MASS INDEX: 28.25 KG/M2

## 2025-07-04 DIAGNOSIS — S92.355A CLOSED NONDISPLACED FRACTURE OF FIFTH METATARSAL BONE OF LEFT FOOT, INITIAL ENCOUNTER: ICD-10-CM

## 2025-07-04 PROCEDURE — 250N000013 HC RX MED GY IP 250 OP 250 PS 637: Performed by: STUDENT IN AN ORGANIZED HEALTH CARE EDUCATION/TRAINING PROGRAM

## 2025-07-04 PROCEDURE — 99284 EMERGENCY DEPT VISIT MOD MDM: CPT | Mod: 57 | Performed by: STUDENT IN AN ORGANIZED HEALTH CARE EDUCATION/TRAINING PROGRAM

## 2025-07-04 PROCEDURE — 99284 EMERGENCY DEPT VISIT MOD MDM: CPT

## 2025-07-04 PROCEDURE — 28470 CLTX METATARSAL FX WO MNP EA: CPT | Mod: 54 | Performed by: STUDENT IN AN ORGANIZED HEALTH CARE EDUCATION/TRAINING PROGRAM

## 2025-07-04 PROCEDURE — 29515 APPLICATION SHORT LEG SPLINT: CPT

## 2025-07-04 PROCEDURE — 73630 X-RAY EXAM OF FOOT: CPT | Mod: LT

## 2025-07-04 RX ORDER — OXYCODONE HYDROCHLORIDE 5 MG/1
5 TABLET ORAL EVERY 6 HOURS PRN
Qty: 6 TABLET | Refills: 0 | Status: SHIPPED | OUTPATIENT
Start: 2025-07-04

## 2025-07-04 RX ORDER — OXYCODONE HYDROCHLORIDE 5 MG/1
5 TABLET ORAL ONCE
Refills: 0 | Status: COMPLETED | OUTPATIENT
Start: 2025-07-04 | End: 2025-07-04

## 2025-07-04 RX ADMIN — OXYCODONE HYDROCHLORIDE 5 MG: 5 TABLET ORAL at 21:09

## 2025-07-04 ASSESSMENT — ENCOUNTER SYMPTOMS
ABDOMINAL PAIN: 0
DYSURIA: 0
HEMATURIA: 0
RHINORRHEA: 0
EYE REDNESS: 0
ACTIVITY CHANGE: 0
SORE THROAT: 0
SHORTNESS OF BREATH: 0
MYALGIAS: 0
COUGH: 0
CHILLS: 0
FEVER: 0
DIARRHEA: 0
APPETITE CHANGE: 0
DIZZINESS: 0
NECK STIFFNESS: 0
HEADACHES: 0
ARTHRALGIAS: 0
FATIGUE: 0
NAUSEA: 0
VOMITING: 0

## 2025-07-04 ASSESSMENT — ACTIVITIES OF DAILY LIVING (ADL): ADLS_ACUITY_SCORE: 41

## 2025-07-04 ASSESSMENT — COLUMBIA-SUICIDE SEVERITY RATING SCALE - C-SSRS
2. HAVE YOU ACTUALLY HAD ANY THOUGHTS OF KILLING YOURSELF IN THE PAST MONTH?: NO
6. HAVE YOU EVER DONE ANYTHING, STARTED TO DO ANYTHING, OR PREPARED TO DO ANYTHING TO END YOUR LIFE?: NO
1. IN THE PAST MONTH, HAVE YOU WISHED YOU WERE DEAD OR WISHED YOU COULD GO TO SLEEP AND NOT WAKE UP?: NO

## 2025-07-04 NOTE — Clinical Note
Jessica Tovar was seen and treated in our emergency department on 7/4/2025.  She may return to work on 07/09/2025.       If you have any questions or concerns, please don't hesitate to call.      David Resendiz MD

## 2025-07-05 NOTE — ED PROVIDER NOTES
History     Chief Complaint   Patient presents with    Foot Injury     HPI  Jessica Tovar is a 45 year old female who is with left foot pain after trying to a cannonball in a pool and hit the side of the pool.  She has left lateral foot pain and has difficulty ambulating.    Allergies:  Allergies   Allergen Reactions    Hydrocodone-Acetaminophen Itching    Morphine Itching    Ofloxacin      floxin       Problem List:    Patient Active Problem List    Diagnosis Date Noted    Gross hematuria 2024     Priority: Medium    Other tear of medial meniscus of right knee as current injury, initial encounter 2022     Priority: Medium     Added automatically from request for surgery [id number]      Endometriosis of pelvic peritoneum 2018     Priority: Medium    Elevated blood pressure reading without diagnosis of hypertension 2018     Priority: Medium    Previous  delivery, antepartum condition or complication 2002     Priority: Medium    Intractable migraine with aura      Priority: Medium     Problem list name updated by automated process. Provider to review          Past Medical History:    Past Medical History:   Diagnosis Date    Dysmenorrhea 2018    Menorrhagia 2018    Migraine without aura, with intractable migraine, so stated, without mention of status migrainosus     Tear of medial meniscus of left knee, current     Varicella without mention of complication        Past Surgical History:    Past Surgical History:   Procedure Laterality Date    HC DILATION/CURETTAGE DIAG/THER NON OB  2001    HC REMOVE TONSILS/ADENOIDS,<11 Y/O      HYSTERECTOMY, PAP NO LONGER INDICATED      LAPAROSCOPIC ASSISTED HYSTERECTOMY VAGINAL N/A 2018    Procedure: LAPAROSCOPIC ASSISTED HYSTERECTOMY VAGINAL;  Laparoscopic Assisted Vaginal Hysterectomy with Bilateral salpingo-opherectomy, cystoscopy;  Surgeon: Molly Vo MD;  Location: [facility]  DELIVERY ONLY   "10/27/1999    [facility]  DELIVERY ONLY  2002    BTL at the time of        Family History:    Family History   Problem Relation Age of Onset    Diabetes Mother     Depression Mother     Alcohol/Drug Father         ETOH    Diabetes Father     Depression Sister         older    Arthritis Maternal Grandmother     Heart Disease Paternal Grandfather     Diabetes Brother         younger    Other - See Comments Son         10/27/1999    Other - See Comments Son         2002       Social History:  Marital Status:  Single [1]  Social History     Tobacco Use    Smoking status: Former     Current packs/day: 0.00     Average packs/day: 0.3 packs/day for 6.0 years (1.5 ttl pk-yrs)     Types: Cigarettes     Start date: 8/15/1995     Quit date: 8/15/2001     Years since quittin.9    Smokeless tobacco: Never    Tobacco comments:     Stopped smoking when found out pg again.   Substance Use Topics    Alcohol use: Yes     Comment: Would drink 3 to 4  per month.    Drug use: No        Medications:    oxyCODONE (ROXICODONE) 5 MG tablet  nitroFURantoin macrocrystal-monohydrate (MACROBID) 100 MG capsule          Review of Systems   Constitutional:  Negative for activity change, appetite change, chills, fatigue and fever.   HENT:  Negative for congestion, rhinorrhea and sore throat.    Eyes:  Negative for redness.   Respiratory:  Negative for cough and shortness of breath.    Cardiovascular:  Negative for chest pain.   Gastrointestinal:  Negative for abdominal pain, diarrhea, nausea and vomiting.   Genitourinary:  Negative for dysuria and hematuria.   Musculoskeletal:  Negative for arthralgias, myalgias and neck stiffness.        Left foot pain   Skin:  Negative for rash.   Neurological:  Negative for dizziness and headaches.   All other systems reviewed and are negative.      Physical Exam   BP: (!) 129/98  Pulse: 111  Temp: 96.9  F (36.1  C)  Resp: 16  Height: 170.2 cm (5' 7\")  Weight: 81.6 kg (180 lb)  SpO2: 99 " %      Physical Exam  Vitals and nursing note reviewed.   Constitutional:       General: She is not in acute distress.     Appearance: Normal appearance. She is normal weight. She is not diaphoretic.   HENT:      Head: Atraumatic.      Mouth/Throat:      Mouth: Mucous membranes are moist.   Eyes:      General: No scleral icterus.     Conjunctiva/sclera: Conjunctivae normal.   Cardiovascular:      Rate and Rhythm: Normal rate.      Heart sounds: Normal heart sounds.   Pulmonary:      Effort: No respiratory distress.      Breath sounds: Normal breath sounds.   Abdominal:      General: Abdomen is flat.   Musculoskeletal:         General: Tenderness present.      Cervical back: Neck supple.        Legs:       Comments: Patient at the area of the fifth metatarsal concerning for fracture.  Signs of deformity however bruising and swelling is present.  Vascular intact distally.   Skin:     General: Skin is warm.      Findings: No rash.   Neurological:      Mental Status: She is alert.         ED Course        [facility]    Splint Application    Date/Time: 7/4/2025 8:52 PM    Performed by: David Resendiz MD  Authorized by: David Resendiz MD    Risks, benefits and alternatives discussed.      PRE-PROCEDURE DETAILS     Sensation:  Normal    Skin color:  Pink    PROCEDURE DETAILS     Laterality:  Left    Location:  Foot    Foot:  L foot    Strapping: no      Cast type:  Short leg    Splint type: posterior short leg.    Supplies:  Ortho-Glass    POST PROCEDURE DETAILS     Pain:  Improved    Sensation:  Normal    Skin color:  Pink      PROCEDURE    Patient Tolerance:  Patient tolerated the procedure well with no immediate complications                  Recent Results (from the past 24 hours)   Foot XR, G/E 3 views, left    Narrative    EXAM: XR FOOT LEFT G/E 3 VIEWS  LOCATION: [facility]  DATE: 7/4/2025    INDICATION: Foot pain.  COMPARISON: None.       Impression    IMPRESSION: Acute minimally displaced spiral/oblique fracture of the fifth metatarsal shaft with soft tissue swelling. Joint spaces are maintained    NOTE: ABNORMAL REPORT    THE DICTATION ABOVE DESCRIBES AN ABNORMALITY FOR WHICH FOLLOW-UP IS NEEDED.    Splint Application    Narrative    David Resendiz MD     7/4/2025  8:56 PM  [facility]    Splint Application    Date/Time: 7/4/2025 8:52 PM    Performed by: David Resendiz MD  Authorized by: David Resendiz MD    Risks, benefits and alternatives discussed.      PRE-PROCEDURE DETAILS     Sensation:  Normal    Skin color:  Pink    PROCEDURE DETAILS     Laterality:  Left    Location:  Foot    Foot:  L foot    Strapping: no      Cast type:  Short leg    Splint type: posterior short leg.    Supplies:  Ortho-Glass    POST PROCEDURE DETAILS     Pain:  Improved    Sensation:  Normal    Skin color:  Pink      PROCEDURE    Patient Tolerance:  Patient tolerated the procedure well with no immediate   complications       Medications   oxyCODONE (ROXICODONE) tablet 5 mg (has no administration in time range)       Assessments & Plan (with Medical Decision Making)     I have reviewed the nursing notes.    I have reviewed the findings, diagnosis, plan and need for follow up with the patient.    Medical Decision Making  Jessica Tovar is a 45 year old female who is with left foot pain after trying to a cannonball in a pool and hit the side of the pool.  She has left lateral foot pain and has difficulty ambulating.    Vitals reassuring aside from discomfort she is having a mild tachycardia however reassuring in the room once settled.  Examination shows tenderness to the left lateral aspect of the foot.  No ankle joint discomfort or pain.  No proximal tibial pathology identified.  Neurovascular intact.  On self interpretation concern for fracture of the left foot fifth metatarsal with oblique pattern.  Please see radiology  read for further interpretation.  Posterior splint with crutches provided nonweightbearing and outpatient follow-up instructions provided with orthopedics.  Patient is happy with plan and discharged home with work note.  Dose of oxycodone was provided for pain control prescription for pain control provided along with risks and benefits of medications and pain support at home.  Elevation and Ace wrap discussed in regards to removal if any changes in sensation of the distal extremity occurs.  Patient is happy with this plan and discharged home with friend.      New Prescriptions    OXYCODONE (ROXICODONE) 5 MG TABLET    Take 1 tablet (5 mg) by mouth every 6 hours as needed for severe pain.       Final diagnoses:   Closed nondisplaced fracture of fifth metatarsal bone of left foot, initial encounter       7/4/2025   [facility] EMERGENCY DEPT       David Resendiz MD  07/04/25 2056       David Resendiz MD  07/04/25 2058

## 2025-07-05 NOTE — ED TRIAGE NOTES
"Pt comes in today along with her sister in law with c/o left foot pain. Pt states that she was attempting to \"mt ball\" someone and hit her left foot on the side of the pool. No pharmacological intervention     Triage Assessment (Adult)       Row Name 07/04/25 2018          Triage Assessment    Airway WDL WDL        Respiratory WDL    Respiratory WDL WDL        Skin Circulation/Temperature WDL    Skin Circulation/Temperature WDL WDL        Cardiac WDL    Cardiac WDL WDL        Peripheral/Neurovascular WDL    Peripheral Neurovascular WDL WDL        Cognitive/Neuro/Behavioral WDL    Cognitive/Neuro/Behavioral WDL WDL                     "

## 2025-07-05 NOTE — DISCHARGE INSTRUCTIONS
You have a oblique fracture of the fifth metatarsal which should be evaluated by orthopedics in next 1 to 2 weeks for reevaluation to ensure no significant displacement occurring.  Do not weight-bear with this and use the crutches as best as possible.  Pain medication sent to pharmacy can initiate this as needed throughout the evening and follow-up with primary team early next week if not more medications are required otherwise ibuprofen Tylenol should be mainstay of treatment keeping foot elevated will help with pain and swelling.

## 2025-07-07 ENCOUNTER — OFFICE VISIT (OUTPATIENT)
Dept: PODIATRY | Facility: CLINIC | Age: 45
End: 2025-07-07
Attending: STUDENT IN AN ORGANIZED HEALTH CARE EDUCATION/TRAINING PROGRAM
Payer: COMMERCIAL

## 2025-07-07 VITALS — HEIGHT: 67 IN | BODY MASS INDEX: 28.25 KG/M2 | WEIGHT: 180 LBS

## 2025-07-07 DIAGNOSIS — S92.355A CLOSED NONDISPLACED FRACTURE OF FIFTH METATARSAL BONE OF LEFT FOOT, INITIAL ENCOUNTER: Primary | ICD-10-CM

## 2025-07-07 PROCEDURE — 99203 OFFICE O/P NEW LOW 30 MIN: CPT | Performed by: PODIATRIST

## 2025-07-07 RX ORDER — BUPROPION HYDROCHLORIDE 100 MG/1
100 TABLET ORAL DAILY
COMMUNITY
Start: 2025-07-02

## 2025-07-07 ASSESSMENT — PAIN SCALES - GENERAL: PAINLEVEL_OUTOF10: MODERATE PAIN (4)

## 2025-07-07 NOTE — LETTER
2025      Jessica Tovar  [address] [facility] [address]      Dear Colleague,    Thank you for referring your patient, Jessica Tovar, to the [facility]. Please see a copy of my visit note below.    HPI:  Jessica Tovar is a 45 year old female who is seen in consultation at the request of ED ROSE [facility] - David Resendiz MD    Pt presents for eval of:   (Onset, Location, L/R, Character, Treatments, Injury if yes)    XR Left foot 2025     Onset 2025, jumped into inground pool and hit side of Left foot on side of pool.   Presents with lateral Left foot pain, NWB w/splint and crutches.  Left foot fracture in the past.  Constant swelling, bruising, dull ache. Intermittent sharp, throbbing, stabbing pain 4-8/10, tingling in toes.     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    Walks 2-4 miles three times a week.     ROS:  10 point ROS neg other than the symptoms noted above in the HPI.    Patient Active Problem List   Diagnosis     Intractable migraine with aura     Previous  delivery, antepartum condition or complication     Elevated blood pressure reading without diagnosis of hypertension     Endometriosis of pelvic peritoneum     Other tear of medial meniscus of right knee as current injury, initial encounter     Gross hematuria       PAST MEDICAL HISTORY:   Past Medical History:   Diagnosis Date     Dysmenorrhea 2018     Menorrhagia 2018     Migraine without aura, with intractable migraine, so stated, without mention of status migrainosus     Hx of migraines. Takes Midrin prn migraine per Dr. Eddy.     Tear of medial meniscus of left knee, current      Varicella without mention of complication     Had chicken pox as a child ages 3 and 8     PAST SURGICAL HISTORY:   Past Surgical History:   Procedure Laterality Date     HC DILATION/CURETTAGE DIAG/THER NON OB   2001     HC REMOVE TONSILS/ADENOIDS,<13 Y/O       HYSTERECTOMY, PAP NO LONGER INDICATED       LAPAROSCOPIC ASSISTED HYSTERECTOMY VAGINAL N/A 2018    Procedure: LAPAROSCOPIC ASSISTED HYSTERECTOMY VAGINAL;  Laparoscopic Assisted Vaginal Hysterectomy with Bilateral salpingo-opherectomy, cystoscopy;  Surgeon: Molly Vo MD;  Location: [place] OR     [facility]  DELIVERY ONLY  10/27/1999     [facility]  DELIVERY ONLY  2002    BTL at the time of         MEDICATIONS:   Current Outpatient Medications:      buPROPion (WELLBUTRIN) 100 MG tablet, Take 100 mg by mouth daily., Disp: , Rfl:      oxyCODONE (ROXICODONE) 5 MG tablet, Take 1 tablet (5 mg) by mouth every 6 hours as needed for severe pain., Disp: 6 tablet, Rfl: 0     ALLERGIES:    Allergies   Allergen Reactions     Hydrocodone-Acetaminophen Itching     Morphine Itching     Ofloxacin      floxin        SOCIAL HISTORY:   Social History     Socioeconomic History     Marital status: Single     Spouse name: Not on file     Number of children: 2     Years of education: Not on file     Highest education level: Not on file   Occupational History     Occupation: Day care     Employer: [id number]   Tobacco Use     Smoking status: Former     Current packs/day: 0.00     Average packs/day: 0.3 packs/day for 6.0 years (1.5 ttl pk-yrs)     Types: Cigarettes     Start date: 8/15/1995     Quit date: 8/15/2001     Years since quittin.9     Smokeless tobacco: Never     Tobacco comments:     Stopped smoking when found out pg again.   Substance and Sexual Activity     Alcohol use: Yes     Comment: Would drink 3 to 4  per month.     Drug use: No     Sexual activity: Yes     Partners: Male     Comment: Was not using any form of birth control.   Other Topics Concern     Parent/sibling w/ CABG, MI or angioplasty before 65F 55M? Not Asked   Social History Narrative     Not on file     Social Drivers of Health     Financial Resource Strain: Not on file  "  Food Insecurity: Not on file   Transportation Needs: Not on file   Physical Activity: Not on file   Stress: Not on file   Social Connections: Not on file   Interpersonal Safety: Low Risk  (8/22/2024)    Interpersonal Safety      Do you feel physically and emotionally safe where you currently live?: Yes      Within the past 12 months, have you been hit, slapped, kicked or otherwise physically hurt by someone?: No      Within the past 12 months, have you been humiliated or emotionally abused in other ways by your partner or ex-partner?: No   Housing Stability: Not on file        FAMILY HISTORY:   Family History   Problem Relation Age of Onset     Diabetes Mother      Depression Mother      Alcohol/Drug Father         ETOH     Diabetes Father      Depression Sister         older     Arthritis Maternal Grandmother      Heart Disease Paternal Grandfather      Diabetes Brother         younger     Other - See Comments Son         10/27/1999     Other - See Comments Son         05/08/2002        EXAM:Vitals: Ht 1.702 m (5' 7\")   Wt 81.6 kg (180 lb)   LMP 07/23/2018   BMI 28.19 kg/m    BMI= Body mass index is 28.19 kg/m .    General appearance: Patient is alert and fully cooperative with history & exam.  No sign of distress is noted during the visit.     Psychiatric: Affect is pleasant & appropriate.  Patient appears motivated to improve health.     Respiratory: Breathing is regular & unlabored while sitting.     HEENT: Hearing is intact to spoken word.  Speech is clear.  No gross evidence of visual impairment that would impact ambulation.     Vascular: DP & PT pulses are intact & regular bilaterally.  No significant edema or varicosities noted.  CFT and skin temperature is normal to both lower extremities.     Neurologic: Lower extremity sensation is intact to light touch.  No evidence of weakness or contracture in the lower extremities.  No evidence of neuropathy.    Dermatologic: Skin is intact to both lower " extremities with adequate texture, turgor and tone about the integument.  No paronychia or evidence of soft tissue infection is noted.     Musculoskeletal: Patient is ambulatory with posterior splint on the left foot.  Pain and guarding with localized edema is noted with any palpation of the left fifth metatarsal.  No pain throughout range of motion of the metatarsal phalangeal joint or proximal fifth metatarsal.  No pain through the Achilles peroneal or posterior tibial tendons.    Radiographs: 3 views of the left foot 7/4/2025 demonstrate incomplete fracture of the fifth metatarsal shaft long oblique fracture noted.  No displacement noted.     ASSESSMENT:       ICD-10-CM    1. Closed nondisplaced fracture of fifth metatarsal bone of left foot, initial encounter  S92.355A Orthopedic  Referral     XR Foot Left G/E 3 Views     Ankle/Foot Bracing Supplies Order Walking Boot (small); Left; Pneumatic; Tall           PLAN:  Reviewed patient[facility] in [facility].      7/7/2025   Interpreted radiographs demonstrating nondisplaced distal fracture of the fifth metatarsal shaft.  This is not a Gusman or dysvascular fracture.  Dispensed a fracture boot.  Can be unlimited seated work now. Up to 15 minutes of standing or walking per 60 minutes.   WB in boot, as this is a distal shaft fracture not a dysvascular Gusman fracture.  Boot 24/7 even rest and sleep  Compression during day  RTC 3 weeks to repeat imaging and confirm alignment      Reed Roy DPM        Again, thank you for allowing me to participate in the care of your patient.        Sincerely,        Reed Roy DPM    Electronically signed

## 2025-07-07 NOTE — PATIENT INSTRUCTIONS
Can be unlimited seated work now. Up to 15 minutes of standing or walking per 60 minutes.   Compression during day  Boot for all WB and sleep and rest  Expect restrictions for 6-8 weeks and boot for 6 weeks  Return in 2-3 weeks to repeat imaging and confirm no change in alignment.   Stay in good shoes on right foot to reduce risk of injury.

## 2025-07-07 NOTE — PROGRESS NOTES
HPI:  Jessica Tovar is a 45 year old female who is seen in consultation at the request of ED [facility] - David Resendiz MD    Pt presents for eval of:   (Onset, Location, L/R, Character, Treatments, Injury if yes)    XR Left foot 2025     Onset 2025, jumped into inground pool and hit side of Left foot on side of pool.   Presents with lateral Left foot pain, NWB w/splint and crutches.  Left foot fracture in the past.  Constant swelling, bruising, dull ache. Intermittent sharp, throbbing, stabbing pain 4-8/10, tingling in toes.     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    Walks 2-4 miles three times a week.     ROS:  10 point ROS neg other than the symptoms noted above in the HPI.    Patient Active Problem List   Diagnosis    Intractable migraine with aura    Previous  delivery, antepartum condition or complication    Elevated blood pressure reading without diagnosis of hypertension    Endometriosis of pelvic peritoneum    Other tear of medial meniscus of right knee as current injury, initial encounter    Gross hematuria       PAST MEDICAL HISTORY:   Past Medical History:   Diagnosis Date    Dysmenorrhea 2018    Menorrhagia 2018    Migraine without aura, with intractable migraine, so stated, without mention of status migrainosus     Hx of migraines. Takes Midrin prn migraine per Dr. Eddy.    Tear of medial meniscus of left knee, current     Varicella without mention of complication     Had chicken pox as a child ages 3 and 8     PAST SURGICAL HISTORY:   Past Surgical History:   Procedure Laterality Date    HC DILATION/CURETTAGE DIAG/THER NON OB  2001    HC REMOVE TONSILS/ADENOIDS,<11 Y/O      HYSTERECTOMY, PAP NO LONGER INDICATED      LAPAROSCOPIC ASSISTED HYSTERECTOMY VAGINAL N/A 2018    Procedure: LAPAROSCOPIC ASSISTED HYSTERECTOMY VAGINAL;  Laparoscopic Assisted Vaginal  Hysterectomy with Bilateral salpingo-opherectomy, cystoscopy;  Surgeon: Molly Vo MD;  Location: [place] OR    [facility]  DELIVERY ONLY  10/27/1999    [facility]  DELIVERY ONLY  2002    BTL at the time of         MEDICATIONS:   Current Outpatient Medications:     buPROPion (WELLBUTRIN) 100 MG tablet, Take 100 mg by mouth daily., Disp: , Rfl:     oxyCODONE (ROXICODONE) 5 MG tablet, Take 1 tablet (5 mg) by mouth every 6 hours as needed for severe pain., Disp: 6 tablet, Rfl: 0     ALLERGIES:    Allergies   Allergen Reactions    Hydrocodone-Acetaminophen Itching    Morphine Itching    Ofloxacin      floxin        SOCIAL HISTORY:   Social History     Socioeconomic History    Marital status: Single     Spouse name: Not on file    Number of children: 2    Years of education: Not on file    Highest education level: Not on file   Occupational History    Occupation: Day care     Employer: 0UNEMPLOYED   Tobacco Use    Smoking status: Former     Current packs/day: 0.00     Average packs/day: 0.3 packs/day for 6.0 years (1.5 ttl pk-yrs)     Types: Cigarettes     Start date: 8/15/1995     Quit date: 8/15/2001     Years since quittin.9    Smokeless tobacco: Never    Tobacco comments:     Stopped smoking when found out pg again.   Substance and Sexual Activity    Alcohol use: Yes     Comment: Would drink 3 to 4  per month.    Drug use: No    Sexual activity: Yes     Partners: Male     Comment: Was not using any form of birth control.   Other Topics Concern    Parent/sibling w/ CABG, MI or angioplasty before 65F 55M? Not Asked   Social History Narrative    Not on file     Social Drivers of Health     Financial Resource Strain: Not on file   Food Insecurity: Not on file   Transportation Needs: Not on file   Physical Activity: Not on file   Stress: Not on file   Social Connections: Not on file   Interpersonal Safety: Low Risk  (2024)    Interpersonal Safety     Do you feel physically and  "emotionally safe where you currently live?: Yes     Within the past 12 months, have you been hit, slapped, kicked or otherwise physically hurt by someone?: No     Within the past 12 months, have you been humiliated or emotionally abused in other ways by your partner or ex-partner?: No   Housing Stability: Not on file        FAMILY HISTORY:   Family History   Problem Relation Age of Onset    Diabetes Mother     Depression Mother     Alcohol/Drug Father         ETOH    Diabetes Father     Depression Sister         older    Arthritis Maternal Grandmother     Heart Disease Paternal Grandfather     Diabetes Brother         younger    Other - See Comments Son         10/27/1999    Other - See Comments Son         05/08/2002        EXAM:Vitals: Ht 1.702 m (5' 7\")   Wt 81.6 kg (180 lb)   LMP 07/23/2018   BMI 28.19 kg/m    BMI= Body mass index is 28.19 kg/m .    General appearance: Patient is alert and fully cooperative with history & exam.  No sign of distress is noted during the visit.     Psychiatric: Affect is pleasant & appropriate.  Patient appears motivated to improve health.     Respiratory: Breathing is regular & unlabored while sitting.     HEENT: Hearing is intact to spoken word.  Speech is clear.  No gross evidence of visual impairment that would impact ambulation.     Vascular: DP & PT pulses are intact & regular bilaterally.  No significant edema or varicosities noted.  CFT and skin temperature is normal to both lower extremities.     Neurologic: Lower extremity sensation is intact to light touch.  No evidence of weakness or contracture in the lower extremities.  No evidence of neuropathy.    Dermatologic: Skin is intact to both lower extremities with adequate texture, turgor and tone about the integument.  No paronychia or evidence of soft tissue infection is noted.     Musculoskeletal: Patient is ambulatory with posterior splint on the left foot.  Pain and guarding with localized edema is noted with any " palpation of the left fifth metatarsal.  No pain throughout range of motion of the metatarsal phalangeal joint or proximal fifth metatarsal.  No pain through the Achilles peroneal or posterior tibial tendons.    Radiographs: 3 views of the left foot 7/4/2025 demonstrate incomplete fracture of the fifth metatarsal shaft long oblique fracture noted.  No displacement noted.     ASSESSMENT:       ICD-10-CM    1. Closed nondisplaced fracture of fifth metatarsal bone of left foot, initial encounter  S92.355A Orthopedic  Referral     XR Foot Left G/E 3 Views     Ankle/Foot Bracing Supplies Order Walking Boot (small); Left; Pneumatic; Tall           PLAN:  Reviewed patient's chart in [facility].      7/7/2025   Interpreted radiographs demonstrating nondisplaced distal fracture of the fifth metatarsal shaft.  This is not a Gusman or dysvascular fracture.  Dispensed a fracture boot.  Can be unlimited seated work now. Up to 15 minutes of standing or walking per 60 minutes.   WB in boot, as this is a distal shaft fracture not a dysvascular Gusman fracture.  Boot 24/7 even rest and sleep  Compression during day  RTC 3 weeks to repeat imaging and confirm alignment      Reed Roy DPM

## 2025-07-07 NOTE — NURSING NOTE
Dispensed 1 Pneumatic Walking Brace, Size small, with [facility] agreement signed by patient. Ellie Ridley CMA, July 7, 2025

## 2025-07-07 NOTE — LETTER
July 7, 2025      Jessica Tovar  [address] [facility] [address]        To Whom It May Concern:    Jessica Tovar was seen in our clinic. She may return to unlimited seated work now. Up to 15 minutes of standing or walking per 60 minutes. Expect restrictions for 6-8 weeks.     Sincerely,      Reed Roy        Electronically signed

## 2025-07-24 ENCOUNTER — OFFICE VISIT (OUTPATIENT)
Dept: PODIATRY | Facility: CLINIC | Age: 45
End: 2025-07-24
Payer: COMMERCIAL

## 2025-07-24 VITALS — HEIGHT: 67 IN | WEIGHT: 180 LBS | BODY MASS INDEX: 28.25 KG/M2

## 2025-07-24 DIAGNOSIS — S92.355A CLOSED NONDISPLACED FRACTURE OF FIFTH METATARSAL BONE OF LEFT FOOT, INITIAL ENCOUNTER: Primary | ICD-10-CM

## 2025-07-24 ASSESSMENT — PAIN SCALES - GENERAL: PAINLEVEL_OUTOF10: NO PAIN (0)

## 2025-07-24 NOTE — PROGRESS NOTES
Chief Complaint   Patient presents with    RECHECK     (2w6d) WB w/tall gray fx boot, Left 5th metatarsal distal fx, DOI 2025; XR L foot 2025; LOV 2025     HPI:  Jessica Tovar is a 45 year old female who is seen in consultation at the request of ED DEPT - David Resendiz MD    Pt presents for eval of:   (Onset, Location, L/R, Character, Treatments, Injury if yes)    XR Left foot 2025     Onset 2025, jumped into inground pool and hit side of Left foot on side of pool.   Presents with lateral Left foot pain, NWB w/splint and crutches.  Left foot fracture in the past.  Constant swelling, bruising, dull ache. Intermittent sharp, throbbing, stabbing pain 4-8/10, tingling in toes.     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    Normally walks 2-4 miles three times a week.  But has not been doing that since this fracture.    ROS:  10 point ROS neg other than the symptoms noted above in the HPI.    Patient Active Problem List   Diagnosis    Intractable migraine with aura    Previous  delivery, antepartum condition or complication    Elevated blood pressure reading without diagnosis of hypertension    Endometriosis of pelvic peritoneum    Other tear of medial meniscus of right knee as current injury, initial encounter    Gross hematuria       PAST MEDICAL HISTORY:   Past Medical History:   Diagnosis Date    Dysmenorrhea 2018    Menorrhagia 2018    Migraine without aura, with intractable migraine, so stated, without mention of status migrainosus     Hx of migraines. Takes Midrin prn migraine per Dr. Eddy.    Tear of medial meniscus of left knee, current     Varicella without mention of complication     Had chicken pox as a child ages 3 and 8     PAST SURGICAL HISTORY:   Past Surgical History:   Procedure Laterality Date    HC DILATION/CURETTAGE DIAG/THER NON OB   2001    HC REMOVE TONSILS/ADENOIDS,<13 Y/O      HYSTERECTOMY, PAP NO LONGER INDICATED      LAPAROSCOPIC ASSISTED HYSTERECTOMY VAGINAL N/A 2018    Procedure: LAPAROSCOPIC ASSISTED HYSTERECTOMY VAGINAL;  Laparoscopic Assisted Vaginal Hysterectomy with Bilateral salpingo-opherectomy, cystoscopy;  Surgeon: Molly Vo MD;  Location: [place] OR    [facility]  DELIVERY ONLY  10/27/1999    [facility]  DELIVERY ONLY  2002    BTL at the time of         MEDICATIONS:   Current Outpatient Medications:     buPROPion (WELLBUTRIN) 100 MG tablet, Take 100 mg by mouth daily., Disp: , Rfl:      ALLERGIES:    Allergies   Allergen Reactions    Hydrocodone-Acetaminophen Itching    Morphine Itching    Ofloxacin      floxin        SOCIAL HISTORY:   Social History     Socioeconomic History    Marital status: Single     Spouse name: Not on file    Number of children: 2    Years of education: Not on file    Highest education level: Not on file   Occupational History    Occupation: Day care     Employer: 0UNEMPLOYED   Tobacco Use    Smoking status: Former     Current packs/day: 0.00     Average packs/day: 0.3 packs/day for 6.0 years (1.5 ttl pk-yrs)     Types: Cigarettes     Start date: 8/15/1995     Quit date: 8/15/2001     Years since quittin.9    Smokeless tobacco: Never    Tobacco comments:     Stopped smoking when found out pg again.   Substance and Sexual Activity    Alcohol use: Yes     Comment: Would drink 3 to 4  per month.    Drug use: No    Sexual activity: Yes     Partners: Male     Comment: Was not using any form of birth control.   Other Topics Concern    Parent/sibling w/ CABG, MI or angioplasty before 65F 55M? Not Asked   Social History Narrative    Not on file     Social Drivers of Health     Financial Resource Strain: Not on file   Food Insecurity: Not on file   Transportation Needs: Not on file   Physical Activity: Not on file   Stress: Not on file   Social Connections: Not on file  "  Interpersonal Safety: Low Risk  (8/22/2024)    Interpersonal Safety     Do you feel physically and emotionally safe where you currently live?: Yes     Within the past 12 months, have you been hit, slapped, kicked or otherwise physically hurt by someone?: No     Within the past 12 months, have you been humiliated or emotionally abused in other ways by your partner or ex-partner?: No   Housing Stability: Not on file        FAMILY HISTORY:   Family History   Problem Relation Age of Onset    Diabetes Mother     Depression Mother     Alcohol/Drug Father         ETOH    Diabetes Father     Depression Sister         older    Arthritis Maternal Grandmother     Heart Disease Paternal Grandfather     Diabetes Brother         younger    Other - See Comments Son         10/27/1999    Other - See Comments Son         05/08/2002        EXAM:Vitals: Ht 1.702 m (5' 7\")   Wt 81.6 kg (180 lb)   LMP 07/23/2018   BMI 28.19 kg/m    BMI= Body mass index is 28.19 kg/m .    General appearance: Patient is alert and fully cooperative with history & exam.  No sign of distress is noted during the visit.     Psychiatric: Affect is pleasant & appropriate.  Patient appears motivated to improve health.     Respiratory: Breathing is regular & unlabored while sitting.     HEENT: Hearing is intact to spoken word.  Speech is clear.  No gross evidence of visual impairment that would impact ambulation.     Vascular: DP & PT pulses are intact & regular bilaterally.  No significant edema or varicosities noted.  CFT and skin temperature is normal to both lower extremities.     Neurologic: Lower extremity sensation is intact to light touch.  No evidence of weakness or contracture in the lower extremities.  No evidence of neuropathy.    Dermatologic: Skin is intact to both lower extremities with adequate texture, turgor and tone about the integument.  No paronychia or evidence of soft tissue infection is noted.     Musculoskeletal: Patient is ambulatory " with posterior splint on the left foot.  Pain and guarding with localized edema is noted with any palpation of the left fifth metatarsal.  No pain throughout range of motion of the metatarsal phalangeal joint or proximal fifth metatarsal.  No pain through the Achilles peroneal or posterior tibial tendons.    Radiographs: 3 views of the left foot 7/4/2025 demonstrate incomplete fracture of the fifth metatarsal shaft long oblique fracture noted.  No displacement noted.    Radiographs 3 views left foot 7/20/2025 demonstrate fracture of the fifth metatarsal shaft long oblique fracture with minimal displacement from previous imaging but there is a subtle shift.     ASSESSMENT:       ICD-10-CM    1. Closed nondisplaced fracture of fifth metatarsal bone of left foot, initial encounter  S92.355A XR Foot Left G/E 3 Views             PLAN:  Reviewed patient's chart in [facility].      7/7/2025   Interpreted radiographs demonstrating nondisplaced distal fracture of the fifth metatarsal shaft.  This is not a Gusman or dysvascular fracture.  Dispensed a fracture boot.  Can be unlimited seated work now. Up to 15 minutes of standing or walking per 60 minutes.   WB in boot, as this is a distal shaft fracture not a dysvascular Gusman fracture.  Boot 24/7 even rest and sleep  Compression during day  RTC 3 weeks to repeat imaging and confirm alignment    7/24/2025  May continue weightbearing in the fracture boot up to 30 minutes per 60 minutes.  Unlimitied seated work.  Stay in fracture boot for all weightbearing.  Discussed risk of malunion nonunion delayed union.  Encourage compression during day.   Recommend PT, she refuses PT today.   But remains frustrated with being limited in physical activity.  She states she is not able to do her job right now as she is not able to walk and stand moving 10 hours/day as a .  Letter was dispensed for these work restrictions.  Follow-up again in 3 weeks to repeat imaging confirm alignment and  healing..      Reed Roy DPM

## 2025-07-24 NOTE — LETTER
2025      Jessica Tovar  [address] [facility] [address]      Dear Colleague,    Thank you for referring your patient, Jessica Tovar, to the [facility]. Please see a copy of my visit note below.    Chief Complaint   Patient presents with     RECHECK     (2w6d) WB w/tall gray fx boot, Left 5th metatarsal distal fx, DOI 2025; XR L foot 2025; LOV 2025     HPI:  Jessica Tovar is a 45 year old female who is seen in consultation at the request of ED DEPT - David Resendiz MD    Pt presents for eval of:   (Onset, Location, L/R, Character, Treatments, Injury if yes)    XR Left foot 2025     Onset 2025, jumped into inground pool and hit side of Left foot on side of pool.   Presents with lateral Left foot pain, NWB w/splint and crutches.  Left foot fracture in the past.  Constant swelling, bruising, dull ache. Intermittent sharp, throbbing, stabbing pain 4-8/10, tingling in toes.     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    Normally walks 2-4 miles three times a week.  But has not been doing that since this fracture.    ROS:  10 point ROS neg other than the symptoms noted above in the HPI.    Patient Active Problem List   Diagnosis     Intractable migraine with aura     Previous  delivery, antepartum condition or complication     Elevated blood pressure reading without diagnosis of hypertension     Endometriosis of pelvic peritoneum     Other tear of medial meniscus of right knee as current injury, initial encounter     Gross hematuria       PAST MEDICAL HISTORY:   Past Medical History:   Diagnosis Date     Dysmenorrhea 2018     Menorrhagia 2018     Migraine without aura, with intractable migraine, so stated, without mention of status migrainosus     Hx of migraines. Takes Midrin prn migraine per Dr. Eddy.     Tear of medial  meniscus of left knee, current      Varicella without mention of complication     Had chicken pox as a child ages 3 and 8     PAST SURGICAL HISTORY:   Past Surgical History:   Procedure Laterality Date     HC DILATION/CURETTAGE DIAG/THER NON OB  2001     HC REMOVE TONSILS/ADENOIDS,<11 Y/O       HYSTERECTOMY, PAP NO LONGER INDICATED       LAPAROSCOPIC ASSISTED HYSTERECTOMY VAGINAL N/A 2018    Procedure: LAPAROSCOPIC ASSISTED HYSTERECTOMY VAGINAL;  Laparoscopic Assisted Vaginal Hysterectomy with Bilateral salpingo-opherectomy, cystoscopy;  Surgeon: Molly Vo MD;  Location: [place] OR     [facility]  DELIVERY ONLY  10/27/1999     [facility]  DELIVERY ONLY  2002    BTL at the time of         MEDICATIONS:   Current Outpatient Medications:      buPROPion (WELLBUTRIN) 100 MG tablet, Take 100 mg by mouth daily., Disp: , Rfl:      ALLERGIES:    Allergies   Allergen Reactions     Hydrocodone-Acetaminophen Itching     Morphine Itching     Ofloxacin      floxin        SOCIAL HISTORY:   Social History     Socioeconomic History     Marital status: Single     Spouse name: Not on file     Number of children: 2     Years of education: Not on file     Highest education level: Not on file   Occupational History     Occupation: Day care     Employer: 0UNEMPLOYED   Tobacco Use     Smoking status: Former     Current packs/day: 0.00     Average packs/day: 0.3 packs/day for 6.0 years (1.5 ttl pk-yrs)     Types: Cigarettes     Start date: 8/15/1995     Quit date: 8/15/2001     Years since quittin.9     Smokeless tobacco: Never     Tobacco comments:     Stopped smoking when found out pg again.   Substance and Sexual Activity     Alcohol use: Yes     Comment: Would drink 3 to 4  per month.     Drug use: No     Sexual activity: Yes     Partners: Male     Comment: Was not using any form of birth control.   Other Topics Concern     Parent/sibling w/ CABG, MI or angioplasty before 65F 55M? Not Asked  "  Social History Narrative     Not on file     Social Drivers of Health     Financial Resource Strain: Not on file   Food Insecurity: Not on file   Transportation Needs: Not on file   Physical Activity: Not on file   Stress: Not on file   Social Connections: Not on file   Interpersonal Safety: Low Risk  (8/22/2024)    Interpersonal Safety      Do you feel physically and emotionally safe where you currently live?: Yes      Within the past 12 months, have you been hit, slapped, kicked or otherwise physically hurt by someone?: No      Within the past 12 months, have you been humiliated or emotionally abused in other ways by your partner or ex-partner?: No   Housing Stability: Not on file        FAMILY HISTORY:   Family History   Problem Relation Age of Onset     Diabetes Mother      Depression Mother      Alcohol/Drug Father         ETOH     Diabetes Father      Depression Sister         older     Arthritis Maternal Grandmother      Heart Disease Paternal Grandfather      Diabetes Brother         younger     Other - See Comments Son         10/27/1999     Other - See Comments Son         05/08/2002        EXAM:Vitals: Ht 1.702 m (5' 7\")   Wt 81.6 kg (180 lb)   LMP 07/23/2018   BMI 28.19 kg/m    BMI= Body mass index is 28.19 kg/m .    General appearance: Patient is alert and fully cooperative with history & exam.  No sign of distress is noted during the visit.     Psychiatric: Affect is pleasant & appropriate.  Patient appears motivated to improve health.     Respiratory: Breathing is regular & unlabored while sitting.     HEENT: Hearing is intact to spoken word.  Speech is clear.  No gross evidence of visual impairment that would impact ambulation.     Vascular: DP & PT pulses are intact & regular bilaterally.  No significant edema or varicosities noted.  CFT and skin temperature is normal to both lower extremities.     Neurologic: Lower extremity sensation is intact to light touch.  No evidence of weakness or " contracture in the lower extremities.  No evidence of neuropathy.    Dermatologic: Skin is intact to both lower extremities with adequate texture, turgor and tone about the integument.  No paronychia or evidence of soft tissue infection is noted.     Musculoskeletal: Patient is ambulatory with posterior splint on the left foot.  Pain and guarding with localized edema is noted with any palpation of the left fifth metatarsal.  No pain throughout range of motion of the metatarsal phalangeal joint or proximal fifth metatarsal.  No pain through the Achilles peroneal or posterior tibial tendons.    Radiographs: 3 views of the left foot 7/4/2025 demonstrate incomplete fracture of the fifth metatarsal shaft long oblique fracture noted.  No displacement noted.    Radiographs 3 views left foot 7/20/2025 demonstrate fracture of the fifth metatarsal shaft long oblique fracture with minimal displacement from previous imaging but there is a subtle shift.     ASSESSMENT:       ICD-10-CM    1. Closed nondisplaced fracture of fifth metatarsal bone of left foot, initial encounter  S92.355A XR Foot Left G/E 3 Views             PLAN:  Reviewed patient's chart in [facility].      7/7/2025   Interpreted radiographs demonstrating nondisplaced distal fracture of the fifth metatarsal shaft.  This is not a Gusman or dysvascular fracture.  Dispensed a fracture boot.  Can be unlimited seated work now. Up to 15 minutes of standing or walking per 60 minutes.   WB in boot, as this is a distal shaft fracture not a dysvascular Gusman fracture.  Boot 24/7 even rest and sleep  Compression during day  RTC 3 weeks to repeat imaging and confirm alignment    7/24/2025  May continue weightbearing in the fracture boot up to 30 minutes per 60 minutes.  Unlimitied seated work.  Stay in fracture boot for all weightbearing.  Discussed risk of malunion nonunion delayed union.  Encourage compression during day.   Recommend PT, she refuses PT today.   But remains  frustrated with being limited in physical activity.  She states she is not able to do her job right now as she is not able to walk and stand moving 10 hours/day as a .  Letter was dispensed for these work restrictions.  Follow-up again in 3 weeks to repeat imaging confirm alignment and healing..      Reed Roy DPM              Again, thank you for allowing me to participate in the care of your patient.        Sincerely,        Reed Roy DPM    Electronically signed

## 2025-08-12 ENCOUNTER — HOSPITAL ENCOUNTER (OUTPATIENT)
Dept: GENERAL RADIOLOGY | Facility: CLINIC | Age: 45
Discharge: HOME | End: 2025-08-12
Attending: PODIATRIST
Payer: COMMERCIAL

## 2025-08-12 DIAGNOSIS — S92.355A CLOSED NONDISPLACED FRACTURE OF FIFTH METATARSAL BONE OF LEFT FOOT, INITIAL ENCOUNTER: ICD-10-CM

## 2025-08-12 PROCEDURE — 73630 X-RAY EXAM OF FOOT: CPT | Mod: LT

## 2025-08-18 ENCOUNTER — OFFICE VISIT (OUTPATIENT)
Dept: PODIATRY | Facility: CLINIC | Age: 45
End: 2025-08-18
Payer: COMMERCIAL

## 2025-08-18 VITALS — BODY MASS INDEX: 30.37 KG/M2 | HEIGHT: 67 IN | WEIGHT: 193.5 LBS

## 2025-08-18 DIAGNOSIS — S92.355A CLOSED NONDISPLACED FRACTURE OF FIFTH METATARSAL BONE OF LEFT FOOT, INITIAL ENCOUNTER: Primary | ICD-10-CM

## 2025-08-18 PROCEDURE — 99207 PR FRACTURE CARE IN GLOBAL PERIOD: CPT | Performed by: PODIATRIST

## 2025-08-18 ASSESSMENT — PAIN SCALES - GENERAL: PAINLEVEL_OUTOF10: MILD PAIN (1)

## (undated) DEVICE — ESU LIGASURE IMPACT OPEN SEALER/DVDR CVD LG JAW 36MM LF4318

## (undated) DEVICE — BLADE CLIPPER 4406

## (undated) DEVICE — SOL NACL 0.9% INJ 1000ML BAG 07983-09

## (undated) DEVICE — ENDO SHEARS RENEW LAP ENDOCUT SCISSOR TIP 16.5MM 3142

## (undated) DEVICE — SOL WATER IRRIG 1000ML BOTTLE 07139-09

## (undated) DEVICE — GLOVE PROTEXIS W/NEU-THERA 6.5  2D73TE65

## (undated) DEVICE — ADHESIVE SWIFTSET 0.8ML OCTYL SS6

## (undated) DEVICE — TUBING INSUFFLATION W/FILTER CPC TO LUER 620-030-301

## (undated) DEVICE — PAD PERI INDIV WRAP 11" 2022

## (undated) DEVICE — ENDO TROCAR SLEEVE KII ADV FIXATION 05X100MM CFS02

## (undated) DEVICE — ESU LIGASURE MARYLAND LAPAROSCOPIC SLR/DVDR 5MMX37CM LF1937

## (undated) DEVICE — ENDO TROCAR SHIELDED BLADED KII ADV FIX 05X100MM CFB03

## (undated) DEVICE — SU VICRYL 0 CT-2 CR 8X18" J727D

## (undated) DEVICE — SUCTION TIP YANKAUER STR K87

## (undated) DEVICE — ANTIFOG SOLUTION W/FOAM PAD 31142527

## (undated) DEVICE — PREP SKIN SCRUB TRAY 4461A

## (undated) DEVICE — SOL NACL 0.9% IRRIG 1000ML BOTTLE 07138-09

## (undated) DEVICE — GOWN LG DISP 9515

## (undated) DEVICE — DRAPE POUCH INSTRUMENT 3 POCKET 1018L

## (undated) DEVICE — ENDO FLOSEAL APPLICATOR 1500181

## (undated) DEVICE — PACK LAPAROSCOPY/PELVISCOPY STD

## (undated) DEVICE — CATH TRAY FOLEY SURESTEP 16FR W/URINE MTR STATLK LF A303416A

## (undated) DEVICE — SUCTION IRR STRYKERFLOW II W/TIP 250-070-520

## (undated) DEVICE — STOCKING SLEEVE COMPRESSION CALF MED

## (undated) DEVICE — ESU HOLSTER PLASTIC DISP E2400

## (undated) DEVICE — SYR 10ML LL W/O NDL

## (undated) DEVICE — ESU CORD MONOPOLAR 10'  E0510

## (undated) DEVICE — TUBING SUCTION MEDI-VAC 1/4"X20' N620A

## (undated) DEVICE — UTERINE MANIPULATOR HUMI KRONER 6003

## (undated) DEVICE — SU VICRYL 0 CT-1 36" J946H

## (undated) DEVICE — SU MONOCRYL 4-0 PS-2 18" UND Y496G

## (undated) DEVICE — SYR 10ML FINGER CONTROL W/O NDL 309695

## (undated) DEVICE — NDL 22GA 1.5"

## (undated) DEVICE — BLADE KNIFE SURG 15 371115

## (undated) RX ORDER — KETOROLAC TROMETHAMINE 30 MG/ML
INJECTION, SOLUTION INTRAMUSCULAR; INTRAVENOUS
Status: DISPENSED
Start: 2018-07-24

## (undated) RX ORDER — HYDROXYZINE HYDROCHLORIDE 25 MG/1
TABLET, FILM COATED ORAL
Status: DISPENSED
Start: 2018-07-24

## (undated) RX ORDER — DEXAMETHASONE SODIUM PHOSPHATE 4 MG/ML
INJECTION, SOLUTION INTRA-ARTICULAR; INTRALESIONAL; INTRAMUSCULAR; INTRAVENOUS; SOFT TISSUE
Status: DISPENSED
Start: 2018-07-24

## (undated) RX ORDER — OXYCODONE HYDROCHLORIDE 5 MG/1
TABLET ORAL
Status: DISPENSED
Start: 2018-07-24

## (undated) RX ORDER — FENTANYL CITRATE 50 UG/ML
INJECTION, SOLUTION INTRAMUSCULAR; INTRAVENOUS
Status: DISPENSED
Start: 2018-07-24

## (undated) RX ORDER — ONDANSETRON 2 MG/ML
INJECTION INTRAMUSCULAR; INTRAVENOUS
Status: DISPENSED
Start: 2018-07-24

## (undated) RX ORDER — OXYCODONE HCL 10 MG/1
TABLET, FILM COATED, EXTENDED RELEASE ORAL
Status: DISPENSED
Start: 2018-07-24

## (undated) RX ORDER — PHENAZOPYRIDINE HYDROCHLORIDE 200 MG/1
TABLET, FILM COATED ORAL
Status: DISPENSED
Start: 2018-07-24

## (undated) RX ORDER — GLYCOPYRROLATE 0.2 MG/ML
INJECTION, SOLUTION INTRAMUSCULAR; INTRAVENOUS
Status: DISPENSED
Start: 2018-07-24

## (undated) RX ORDER — NEOSTIGMINE METHYLSULFATE 1 MG/ML
VIAL (ML) INJECTION
Status: DISPENSED
Start: 2018-07-24

## (undated) RX ORDER — SCOLOPAMINE TRANSDERMAL SYSTEM 1 MG/1
PATCH, EXTENDED RELEASE TRANSDERMAL
Status: DISPENSED
Start: 2018-07-24

## (undated) RX ORDER — PROPOFOL 10 MG/ML
INJECTION, EMULSION INTRAVENOUS
Status: DISPENSED
Start: 2018-07-24

## (undated) RX ORDER — HYDROMORPHONE HYDROCHLORIDE 1 MG/ML
INJECTION, SOLUTION INTRAMUSCULAR; INTRAVENOUS; SUBCUTANEOUS
Status: DISPENSED
Start: 2018-07-24

## (undated) RX ORDER — BUPIVACAINE HYDROCHLORIDE AND EPINEPHRINE 5; 5 MG/ML; UG/ML
INJECTION, SOLUTION EPIDURAL; INTRACAUDAL; PERINEURAL
Status: DISPENSED
Start: 2018-07-24